# Patient Record
Sex: FEMALE | Race: WHITE | NOT HISPANIC OR LATINO | Employment: OTHER | URBAN - METROPOLITAN AREA
[De-identification: names, ages, dates, MRNs, and addresses within clinical notes are randomized per-mention and may not be internally consistent; named-entity substitution may affect disease eponyms.]

---

## 2019-07-05 ENCOUNTER — HOSPITAL ENCOUNTER (EMERGENCY)
Facility: HOSPITAL | Age: 59
Discharge: HOME | End: 2019-07-05
Attending: EMERGENCY MEDICINE
Payer: COMMERCIAL

## 2019-07-05 VITALS
OXYGEN SATURATION: 100 % | DIASTOLIC BLOOD PRESSURE: 61 MMHG | RESPIRATION RATE: 18 BRPM | HEIGHT: 64 IN | TEMPERATURE: 98 F | BODY MASS INDEX: 25.95 KG/M2 | WEIGHT: 152 LBS | SYSTOLIC BLOOD PRESSURE: 125 MMHG | HEART RATE: 57 BPM

## 2019-07-05 DIAGNOSIS — R07.81 RIB PAIN ON LEFT SIDE: ICD-10-CM

## 2019-07-05 DIAGNOSIS — S16.1XXA CERVICAL STRAIN, ACUTE, INITIAL ENCOUNTER: ICD-10-CM

## 2019-07-05 DIAGNOSIS — S22.42XA CLOSED FRACTURE OF MULTIPLE RIBS OF LEFT SIDE, INITIAL ENCOUNTER: Primary | ICD-10-CM
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
## 2019-07-05 PROCEDURE — 99284 EMERGENCY DEPT VISIT MOD MDM: CPT | Mod: 25,ER

## 2019-07-05 PROCEDURE — 94799 UNLISTED PULMONARY SVC/PX: CPT | Mod: ER

## 2019-07-05 PROCEDURE — 99900035 HC TECH TIME PER 15 MIN (STAT): Mod: ER

## 2019-07-05 PROCEDURE — 25000003 PHARM REV CODE 250: Mod: ER | Performed by: NURSE PRACTITIONER

## 2019-07-05 PROCEDURE — 96372 THER/PROPH/DIAG INJ SC/IM: CPT | Mod: ER

## 2019-07-05 PROCEDURE — 63600175 PHARM REV CODE 636 W HCPCS: Mod: ER | Performed by: NURSE PRACTITIONER

## 2019-07-05 RX ORDER — KETOROLAC TROMETHAMINE 30 MG/ML
30 INJECTION, SOLUTION INTRAMUSCULAR; INTRAVENOUS
Status: COMPLETED | OUTPATIENT
Start: 2019-07-05 | End: 2019-07-05

## 2019-07-05 RX ORDER — ESCITALOPRAM OXALATE 20 MG/1
40 TABLET ORAL DAILY
COMMUNITY
End: 2021-12-03

## 2019-07-05 RX ORDER — HYDROCODONE BITARTRATE AND ACETAMINOPHEN 7.5; 325 MG/1; MG/1
1 TABLET ORAL EVERY 6 HOURS PRN
Qty: 12 TABLET | Refills: 0 | Status: SHIPPED | OUTPATIENT
Start: 2019-07-05 | End: 2019-07-08

## 2019-07-05 RX ORDER — METHOCARBAMOL 500 MG/1
TABLET, FILM COATED ORAL
Status: DISCONTINUED
Start: 2019-07-05 | End: 2019-07-05 | Stop reason: HOSPADM

## 2019-07-05 RX ORDER — MELOXICAM 15 MG/1
15 TABLET ORAL DAILY
COMMUNITY
End: 2023-08-30

## 2019-07-05 RX ORDER — LEVOTHYROXINE SODIUM ANHYDROUS 100 UG/5ML
INJECTION, POWDER, LYOPHILIZED, FOR SOLUTION INTRAVENOUS
COMMUNITY
Start: 2008-02-19 | End: 2021-12-03

## 2019-07-05 RX ORDER — METHOCARBAMOL 750 MG/1
750 TABLET, FILM COATED ORAL
Status: COMPLETED | OUTPATIENT
Start: 2019-07-05 | End: 2019-07-05

## 2019-07-05 RX ORDER — METHOCARBAMOL 750 MG/1
750 TABLET, FILM COATED ORAL 3 TIMES DAILY
Qty: 15 TABLET | Refills: 0 | Status: SHIPPED | OUTPATIENT
Start: 2019-07-05 | End: 2019-07-10

## 2019-07-05 RX ADMIN — KETOROLAC TROMETHAMINE 30 MG: 30 INJECTION, SOLUTION INTRAMUSCULAR at 11:07

## 2019-07-05 RX ADMIN — METHOCARBAMOL TABLETS 750 MG: 500 TABLET, COATED ORAL at 11:07

## 2019-07-05 NOTE — ED PROVIDER NOTES
Encounter Date: 7/5/2019       History     Chief Complaint   Patient presents with    Back Pain     c/o L mid back pain x 2 days worsening with deep inspiration s/p slip and fall on wet floor; reports hitting head; denies LOC    Fall    Nausea     58-year-old female presents with left rib pain after a slip and fall onto a tile floor 2 days ago.  She denies LOC.  She denies any chest pain, shortness breath, weakness, headache.  She is ambulatory without assistance.  She reports the pain is worse with deep breathing.    The history is provided by the patient. No  was used.   Fall   The accident occurred two days ago. The fall occurred while walking. She fell from a height of 1 to 2 ft. She landed on a hard floor. There was no blood loss. The pain is present in the back. The pain is at a severity of 6/10. She was ambulatory at the scene. There was no entrapment after the fall. There was no drug use involved in the accident. There was no alcohol use involved in the accident. Associated symptoms include neck pain, back pain and nausea. Pertinent negatives include no paresthesias, no paralysis, no visual change, no fever, no numbness, no abdominal pain, no bowel incontinence, no vomiting, no hematuria, no headaches, no hearing loss, no loss of consciousness and no tingling. The symptoms are aggravated by pressure on the injury. She has tried nothing for the symptoms.     Review of patient's allergies indicates:   Allergen Reactions    Aspirin      Other reaction(s): whelps & swelling    Sulfa (sulfonamide antibiotics) Rash     Past Medical History:   Diagnosis Date    Depression     GERD (gastroesophageal reflux disease)     Thyroid disease     Vertigo      History reviewed. No pertinent surgical history.  History reviewed. No pertinent family history.  Social History     Tobacco Use    Smoking status: Never Smoker    Smokeless tobacco: Never Used   Substance Use Topics    Alcohol use: Yes      Frequency: Never     Comment: occasion    Drug use: Never     Review of Systems   Constitutional: Negative for fever.   HENT: Negative for sore throat.    Respiratory: Negative for cough, chest tightness, shortness of breath, wheezing and stridor.    Cardiovascular: Negative for chest pain.   Gastrointestinal: Positive for nausea. Negative for abdominal pain, bowel incontinence and vomiting.   Genitourinary: Negative for dysuria and hematuria.   Musculoskeletal: Positive for back pain, myalgias and neck pain. Negative for neck stiffness.   Skin: Negative for rash.   Neurological: Negative for tingling, loss of consciousness, weakness, numbness, headaches and paresthesias.   Hematological: Does not bruise/bleed easily.       Physical Exam     Initial Vitals [07/05/19 1045]   BP Pulse Resp Temp SpO2   (!) 126/58 (!) 58 18 98.4 °F (36.9 °C) 100 %      MAP       --         Physical Exam    Constitutional: Vital signs are normal. She appears well-developed and well-nourished. She is not diaphoretic. She is active.  Non-toxic appearance. She does not have a sickly appearance. She does not appear ill. No distress.   HENT:   Head: Normocephalic and atraumatic.   Neck: Muscular tenderness present. No spinous process tenderness present. No edema, no erythema and normal range of motion present. No neck rigidity.       Cardiovascular: Regular rhythm, S1 normal and S2 normal.   Pulmonary/Chest: Effort normal and breath sounds normal. No respiratory distress. She has no decreased breath sounds. She has no wheezes. She has no rhonchi. She has no rales.           Rib tenderness noted with palpation.   Abdominal: She exhibits no distension. There is no tenderness. There is no rigidity, no rebound, no guarding and no CVA tenderness.   Neurological: She is alert. GCS eye subscore is 4. GCS verbal subscore is 5. GCS motor subscore is 6.   Skin: Skin is warm and dry. No pallor.   Psychiatric: She has a normal mood and affect. Her  speech is normal and behavior is normal. Thought content normal.         ED Course   Procedures  Labs Reviewed - No data to display       Imaging Results          X-Ray Ribs 2 View Left (Final result)  Result time 07/05/19 11:40:01    Final result by ANGEL Baig Sr., MD (07/05/19 11:40:01)                 Impression:      There are poorly visualized fractures in the posterior aspect of the left 7th and 8th ribs.  If additional imaging evaluation is clinically indicated, I recommend consideration of a CT examination of the thorax with IV contrast..      Electronically signed by: Marco Baig MD  Date:    07/05/2019  Time:    11:40             Narrative:    EXAMINATION:  XR RIBS 2 VIEW LEFT    CLINICAL HISTORY:  Pleurodynia    COMPARISON:  None    FINDINGS:  There are poorly visualized fractures in the posterior aspect of the left 7th and 8th ribs.  The visualized portion of the lungs is clear.  There is no pneumothorax.  The left costophrenic angle is sharp..                                 Medical Decision Making:   Clinical Tests:   Radiological Study: Ordered and Reviewed  ED Management:  Lungs sounds are clear throughout.  Fractured rib noted to left 7 and left 8.  Will treat patient with incentive spirometry.  She will follow up with her PCP.  Patient is presently taking Mobic daily therefore will add a prescription for Norco and Robaxin.  Patient agrees with plan of care.                   ED Course as of Jul 05 1238   Fri Jul 05, 2019   1204 The patient agrees the plan of care.  She will follow up with her PCP in 2-3 days.  She is in no apparent distress.  She is ambulatory without assistance.  She will continue taking her medications at home as prescribed.  She will take her Norco and Robaxin as prescribed.    [TS]      ED Course User Index  [TS] Khadar Joiner NP     Clinical Impression:       ICD-10-CM ICD-9-CM   1. Closed fracture of multiple ribs of left side, initial encounter S22.42XA 807.09    2. Rib pain on left side R07.81 786.50   3. Fall from ground level W18.30XA E888.9   4. Fall from slip, trip, or stumble, initial encounter W01.0XXA E885.9   5. Cervical strain, acute, initial encounter S16.1XXA 847.0                                Khadar Joiner, ZANE  07/05/19 1204       Khadar Joiner NP  07/05/19 1238

## 2021-02-21 ENCOUNTER — HOSPITAL ENCOUNTER (EMERGENCY)
Facility: HOSPITAL | Age: 61
Discharge: HOME | End: 2021-02-21
Attending: EMERGENCY MEDICINE
Payer: COMMERCIAL

## 2021-02-21 VITALS
SYSTOLIC BLOOD PRESSURE: 140 MMHG | DIASTOLIC BLOOD PRESSURE: 73 MMHG | HEART RATE: 61 BPM | WEIGHT: 152 LBS | OXYGEN SATURATION: 97 % | TEMPERATURE: 98 F | RESPIRATION RATE: 16 BRPM | BODY MASS INDEX: 25.95 KG/M2 | HEIGHT: 64 IN

## 2021-02-21 DIAGNOSIS — S61.215A LACERATION OF LEFT RING FINGER W/O FOREIGN BODY W/O DAMAGE TO NAIL, INITIAL ENCOUNTER: ICD-10-CM
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
## 2021-02-21 DIAGNOSIS — S61.213A LACERATION OF LEFT MIDDLE FINGER W/O FOREIGN BODY W/O DAMAGE TO NAIL, INITIAL ENCOUNTER: Primary | ICD-10-CM

## 2021-02-21 PROCEDURE — 25000003 PHARM REV CODE 250: Mod: ER | Performed by: PHYSICIAN ASSISTANT

## 2021-02-21 PROCEDURE — 12004 RPR S/N/AX/GEN/TRK7.6-12.5CM: CPT | Mod: F2,ER

## 2021-02-21 PROCEDURE — 99284 EMERGENCY DEPT VISIT MOD MDM: CPT | Mod: 25,ER

## 2021-02-21 RX ORDER — CEPHALEXIN 500 MG/1
500 CAPSULE ORAL EVERY 8 HOURS
Qty: 9 CAPSULE | Refills: 0 | Status: SHIPPED | OUTPATIENT
Start: 2021-02-21 | End: 2021-02-24

## 2021-02-21 RX ORDER — LIDOCAINE HYDROCHLORIDE 10 MG/ML
5 INJECTION, SOLUTION EPIDURAL; INFILTRATION; INTRACAUDAL; PERINEURAL
Status: COMPLETED | OUTPATIENT
Start: 2021-02-21 | End: 2021-02-21

## 2021-02-21 RX ORDER — ACETAMINOPHEN AND CODEINE PHOSPHATE 300; 30 MG/1; MG/1
1 TABLET ORAL EVERY 6 HOURS PRN
Qty: 10 TABLET | Refills: 0 | Status: SHIPPED | OUTPATIENT
Start: 2021-02-21 | End: 2021-02-24

## 2021-02-21 RX ADMIN — Medication: at 01:02

## 2021-02-21 RX ADMIN — LIDOCAINE HYDROCHLORIDE 50 MG: 10 INJECTION, SOLUTION EPIDURAL; INFILTRATION; INTRACAUDAL at 03:02

## 2021-12-03 ENCOUNTER — OFFICE VISIT (OUTPATIENT)
Dept: OBSTETRICS AND GYNECOLOGY | Facility: CLINIC | Age: 61
End: 2021-12-03
Attending: OBSTETRICS & GYNECOLOGY
Payer: COMMERCIAL

## 2021-12-03 ENCOUNTER — LAB VISIT (OUTPATIENT)
Dept: LAB | Facility: HOSPITAL | Age: 61
End: 2021-12-03
Attending: OBSTETRICS & GYNECOLOGY
Payer: COMMERCIAL

## 2021-12-03 VITALS
DIASTOLIC BLOOD PRESSURE: 80 MMHG | SYSTOLIC BLOOD PRESSURE: 122 MMHG | HEIGHT: 64 IN | BODY MASS INDEX: 26 KG/M2 | WEIGHT: 152.31 LBS

## 2021-12-03 DIAGNOSIS — Z00.00 WELLNESS EXAMINATION: ICD-10-CM

## 2021-12-03 DIAGNOSIS — Z11.51 SCREENING FOR HUMAN PAPILLOMAVIRUS: ICD-10-CM

## 2021-12-03 DIAGNOSIS — Z01.419 WELL FEMALE EXAM WITH ROUTINE GYNECOLOGICAL EXAM: Primary | ICD-10-CM

## 2021-12-03 DIAGNOSIS — Z13.820 SCREENING FOR OSTEOPOROSIS: ICD-10-CM

## 2021-12-03 DIAGNOSIS — Z12.31 VISIT FOR SCREENING MAMMOGRAM: ICD-10-CM

## 2021-12-03 LAB — 25(OH)D3+25(OH)D2 SERPL-MCNC: 35 NG/ML (ref 30–96)

## 2021-12-03 PROCEDURE — 82306 VITAMIN D 25 HYDROXY: CPT | Performed by: OBSTETRICS & GYNECOLOGY

## 2021-12-03 PROCEDURE — 99999 PR PBB SHADOW E&M-EST. PATIENT-LVL III: ICD-10-PCS | Mod: PBBFAC,,, | Performed by: OBSTETRICS & GYNECOLOGY

## 2021-12-03 PROCEDURE — 88175 CYTOPATH C/V AUTO FLUID REDO: CPT | Performed by: OBSTETRICS & GYNECOLOGY

## 2021-12-03 PROCEDURE — 99386 PREV VISIT NEW AGE 40-64: CPT | Mod: S$GLB,,, | Performed by: OBSTETRICS & GYNECOLOGY

## 2021-12-03 PROCEDURE — 87624 HPV HI-RISK TYP POOLED RSLT: CPT | Performed by: OBSTETRICS & GYNECOLOGY

## 2021-12-03 PROCEDURE — 99386 PR PREVENTIVE VISIT,NEW,40-64: ICD-10-PCS | Mod: S$GLB,,, | Performed by: OBSTETRICS & GYNECOLOGY

## 2021-12-03 PROCEDURE — 99999 PR PBB SHADOW E&M-EST. PATIENT-LVL III: CPT | Mod: PBBFAC,,, | Performed by: OBSTETRICS & GYNECOLOGY

## 2021-12-03 RX ORDER — CLOBETASOL PROPIONATE 0.5 MG/G
OINTMENT TOPICAL 2 TIMES DAILY
Qty: 60 G | Refills: 0 | Status: SHIPPED | OUTPATIENT
Start: 2021-12-03 | End: 2022-10-10

## 2021-12-03 RX ORDER — PROGESTERONE 200 MG/1
200 CAPSULE ORAL NIGHTLY
Qty: 30 CAPSULE | Refills: 11 | Status: SHIPPED | OUTPATIENT
Start: 2021-12-03 | End: 2022-10-10 | Stop reason: SDUPTHER

## 2021-12-03 RX ORDER — CITALOPRAM 40 MG/1
40 TABLET, FILM COATED ORAL DAILY
COMMUNITY
Start: 2021-08-24 | End: 2022-10-10 | Stop reason: SDUPTHER

## 2021-12-03 RX ORDER — LEVOTHYROXINE SODIUM 100 UG/1
TABLET ORAL
COMMUNITY
Start: 2021-03-08 | End: 2022-09-30

## 2021-12-09 ENCOUNTER — HOSPITAL ENCOUNTER (OUTPATIENT)
Dept: RADIOLOGY | Facility: HOSPITAL | Age: 61
Discharge: HOME | End: 2021-12-09
Attending: OBSTETRICS & GYNECOLOGY
Payer: COMMERCIAL

## 2021-12-09 DIAGNOSIS — Z13.820 SCREENING FOR OSTEOPOROSIS: ICD-10-CM

## 2021-12-09 LAB
HPV HR 12 DNA SPEC QL NAA+PROBE: NEGATIVE
HPV16 AG SPEC QL: NEGATIVE
HPV18 DNA SPEC QL NAA+PROBE: NEGATIVE

## 2021-12-09 PROCEDURE — 77080 DXA BONE DENSITY AXIAL: CPT | Mod: TC,PO

## 2021-12-10 LAB
FINAL PATHOLOGIC DIAGNOSIS: NORMAL
Lab: NORMAL

## 2022-03-14 ENCOUNTER — HOSPITAL ENCOUNTER (EMERGENCY)
Facility: HOSPITAL | Age: 62
Discharge: HOME | End: 2022-03-14
Attending: EMERGENCY MEDICINE
Payer: COMMERCIAL

## 2022-03-14 VITALS
BODY MASS INDEX: 25.95 KG/M2 | TEMPERATURE: 98 F | WEIGHT: 152 LBS | HEIGHT: 64 IN | OXYGEN SATURATION: 100 % | DIASTOLIC BLOOD PRESSURE: 77 MMHG | RESPIRATION RATE: 18 BRPM | SYSTOLIC BLOOD PRESSURE: 169 MMHG | HEART RATE: 52 BPM

## 2022-03-14 DIAGNOSIS — M62.830 BACK MUSCLE SPASM: ICD-10-CM
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
## 2022-03-14 DIAGNOSIS — M79.18 MYALGIA OF MUSCLE OF NECK: Primary | ICD-10-CM

## 2022-03-14 PROCEDURE — 25000003 PHARM REV CODE 250: Mod: ER | Performed by: PHYSICIAN ASSISTANT

## 2022-03-14 PROCEDURE — 63600175 PHARM REV CODE 636 W HCPCS: Mod: ER | Performed by: PHYSICIAN ASSISTANT

## 2022-03-14 PROCEDURE — 99284 EMERGENCY DEPT VISIT MOD MDM: CPT | Mod: 25,ER

## 2022-03-14 PROCEDURE — 96372 THER/PROPH/DIAG INJ SC/IM: CPT | Performed by: PHYSICIAN ASSISTANT

## 2022-03-14 RX ORDER — LIDOCAINE 50 MG/G
1 PATCH TOPICAL DAILY
Qty: 15 PATCH | Refills: 0 | Status: SHIPPED | OUTPATIENT
Start: 2022-03-14 | End: 2022-09-30

## 2022-03-14 RX ORDER — LIDOCAINE 50 MG/G
2 PATCH TOPICAL
Status: DISCONTINUED | OUTPATIENT
Start: 2022-03-14 | End: 2022-03-14 | Stop reason: HOSPADM

## 2022-03-14 RX ORDER — METHOCARBAMOL 500 MG/1
500-1000 TABLET, FILM COATED ORAL 2 TIMES DAILY PRN
Qty: 20 TABLET | Refills: 0 | Status: SHIPPED | OUTPATIENT
Start: 2022-03-14 | End: 2022-03-19

## 2022-03-14 RX ORDER — NAPROXEN 500 MG/1
500 TABLET ORAL 2 TIMES DAILY WITH MEALS
Qty: 20 TABLET | Refills: 0 | Status: SHIPPED | OUTPATIENT
Start: 2022-03-14 | End: 2022-03-24

## 2022-03-14 RX ORDER — KETOROLAC TROMETHAMINE 30 MG/ML
15 INJECTION, SOLUTION INTRAMUSCULAR; INTRAVENOUS
Status: COMPLETED | OUTPATIENT
Start: 2022-03-14 | End: 2022-03-14

## 2022-03-14 RX ADMIN — KETOROLAC TROMETHAMINE 15 MG: 30 INJECTION, SOLUTION INTRAMUSCULAR; INTRAVENOUS at 08:03

## 2022-03-14 RX ADMIN — LIDOCAINE 2 PATCH: 50 PATCH CUTANEOUS at 08:03
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
## 2022-03-15 NOTE — DISCHARGE INSTRUCTIONS
Please follow-up with primary care physician in the next 2-3 days.  Use heat pack to the affected region.  Take medication as prescribed.  Return to the ER for symptoms worsen.

## 2022-06-22 ENCOUNTER — HOSPITAL ENCOUNTER (EMERGENCY)
Facility: HOSPITAL | Age: 62
Discharge: HOME | End: 2022-06-22
Attending: EMERGENCY MEDICINE

## 2022-06-22 VITALS
WEIGHT: 148 LBS | HEIGHT: 64 IN | DIASTOLIC BLOOD PRESSURE: 70 MMHG | TEMPERATURE: 99 F | SYSTOLIC BLOOD PRESSURE: 137 MMHG | RESPIRATION RATE: 24 BRPM | BODY MASS INDEX: 25.27 KG/M2 | OXYGEN SATURATION: 96 % | HEART RATE: 62 BPM

## 2022-06-22 DIAGNOSIS — J06.9 VIRAL URI WITH COUGH: Primary | ICD-10-CM

## 2022-06-22 DIAGNOSIS — R06.02 SOB (SHORTNESS OF BREATH): ICD-10-CM

## 2022-06-22 LAB
GROUP A STREP, MOLECULAR: NEGATIVE
INFLUENZA A, MOLECULAR: NEGATIVE
INFLUENZA B, MOLECULAR: NEGATIVE
SARS-COV-2 RDRP RESP QL NAA+PROBE: NEGATIVE
SPECIMEN SOURCE: NORMAL

## 2022-06-22 PROCEDURE — 93010 EKG 12-LEAD: ICD-10-PCS | Mod: ,,, | Performed by: INTERNAL MEDICINE

## 2022-06-22 PROCEDURE — 99285 EMERGENCY DEPT VISIT HI MDM: CPT | Mod: 25,ER

## 2022-06-22 PROCEDURE — 87502 INFLUENZA DNA AMP PROBE: CPT | Mod: ER | Performed by: EMERGENCY MEDICINE

## 2022-06-22 PROCEDURE — U0002 COVID-19 LAB TEST NON-CDC: HCPCS | Mod: ER | Performed by: EMERGENCY MEDICINE

## 2022-06-22 PROCEDURE — 87502 INFLUENZA DNA AMP PROBE: CPT | Mod: ER

## 2022-06-22 PROCEDURE — 93010 ELECTROCARDIOGRAM REPORT: CPT | Mod: ,,, | Performed by: INTERNAL MEDICINE

## 2022-06-22 PROCEDURE — 93005 ELECTROCARDIOGRAM TRACING: CPT | Mod: ER

## 2022-06-22 PROCEDURE — 99900035 HC TECH TIME PER 15 MIN (STAT): Mod: ER

## 2022-06-22 PROCEDURE — 87651 STREP A DNA AMP PROBE: CPT | Mod: ER | Performed by: EMERGENCY MEDICINE

## 2022-06-22 RX ORDER — ALBUTEROL SULFATE 90 UG/1
1-2 AEROSOL, METERED RESPIRATORY (INHALATION) EVERY 6 HOURS PRN
Qty: 18 G | Refills: 0 | Status: SHIPPED | OUTPATIENT
Start: 2022-06-22 | End: 2023-06-22

## 2022-06-22 RX ORDER — BENZONATATE 100 MG/1
100 CAPSULE ORAL 3 TIMES DAILY PRN
Qty: 20 CAPSULE | Refills: 0 | Status: SHIPPED | OUTPATIENT
Start: 2022-06-22 | End: 2022-07-22

## 2022-06-22 NOTE — ED PROVIDER NOTES
"Encounter Date: 6/22/2022       History     Chief Complaint   Patient presents with    Cough    Shortness of Breath     Sore throat that Monday. Cough that started yesterday. SOB that started today. PT reports productive cough and sputum looks like "oysters"     60-year-old female with history GERD presents with sore throat and hoarseness since Monday and cough that started yesterday.  Patient is also reporting some post-tussive chest discomfort.  This morning she says she felt terrible.  She denies vomiting, abdominal pain, diarrhea, body aches, dysuria.  She says her sputum looks like color of an oyster.        Review of patient's allergies indicates:   Allergen Reactions    Aspirin      Other reaction(s): whelps & swelling    Sulfa (sulfonamide antibiotics) Rash     Past Medical History:   Diagnosis Date    Abnormal Pap smear of cervix     Depression     GERD (gastroesophageal reflux disease)     Melanoma     Thyroid disease     Vertigo      Past Surgical History:   Procedure Laterality Date    CHOLECYSTECTOMY      EXCISION OF MELANOMA Left     ankle with removal of 2 lymph nodes    GYNECOLOGIC CRYOSURGERY  1980     Family History   Problem Relation Age of Onset    Lung cancer Father     Stomach cancer Mother     Brain cancer Mother     Ovarian cancer Sister      Social History     Tobacco Use    Smoking status: Never Smoker    Smokeless tobacco: Never Used   Substance Use Topics    Alcohol use: Yes     Comment: occasion    Drug use: Never     Review of Systems   Constitutional: Negative for fever.   HENT: Positive for sore throat and voice change.    Eyes: Negative for pain.   Respiratory: Positive for cough. Negative for shortness of breath.    Cardiovascular: Positive for chest pain.        Chest pain after cough   Gastrointestinal: Negative for abdominal pain, nausea and vomiting.   Genitourinary: Negative for difficulty urinating.   Musculoskeletal: Negative for back pain and neck pain. "   Neurological: Negative for headaches.   Psychiatric/Behavioral: Negative for confusion.       Physical Exam     Initial Vitals [06/22/22 0949]   BP Pulse Resp Temp SpO2   128/62 68 18 98.9 °F (37.2 °C) 97 %      MAP       --         Physical Exam    Nursing note and vitals reviewed.  HENT:   Head: Atraumatic.   Right Ear: External ear normal.   Left Ear: External ear normal.   Mouth/Throat: Oropharynx is clear and moist.   Eyes: Conjunctivae and EOM are normal.   Neck:   Normal range of motion.  Cardiovascular: Exam reveals no gallop and no friction rub.    No murmur heard.  Pulmonary/Chest: Breath sounds normal. No respiratory distress. She has no wheezes.   Abdominal: Abdomen is soft. There is no abdominal tenderness.   Musculoskeletal:         General: No edema. Normal range of motion.      Cervical back: Normal range of motion.     Neurological: She is alert and oriented to person, place, and time.   Psychiatric: She has a normal mood and affect.         ED Course   Procedures  Labs Reviewed   INFLUENZA A & B BY MOLECULAR   GROUP A STREP, MOLECULAR   SARS-COV-2 RNA AMPLIFICATION, QUAL    Narrative:     Is the patient symptomatic?->Yes        ECG Results          EKG 12-lead (Final result)  Result time 06/22/22 11:15:21    Final result by Interface, Lab In [facility] (06/22/22 11:15:21)                 Narrative:    Test Reason : R06.02,    Vent. Rate : 063 BPM     Atrial Rate : 063 BPM     P-R Int : 184 ms          QRS Dur : 104 ms      QT Int : 412 ms       P-R-T Axes : 064 062 047 degrees     QTc Int : 421 ms    Normal sinus rhythm  Incomplete right bundle branch block  Borderline Abnormal ECG  No previous ECGs available  Confirmed by Taiwo KAM, Matty GAVIRIA ([id number]) on 6/22/2022 11:15:17 AM    Referred By: [email or web address]   SELF           Confirmed By:Matty Maravilla MD                            Imaging Results          X-Ray Chest AP Portable (Final result)  Result time 06/22/22 10:23:10    Final result by Kareen PAULINO  MD José Miguel (06/22/22 10:23:10)                 Impression:      No acute cardiopulmonary abnormality.      Electronically signed by: Kareen José Miguel  Date:    06/22/2022  Time:    10:23             Narrative:    EXAMINATION:  XR CHEST AP PORTABLE    CLINICAL HISTORY:  cough;    TECHNIQUE:  Single frontal view of the chest was performed.    COMPARISON:  Left rib radiograph 07/05/2019    FINDINGS:  ECG monitoring leads overlie the chest.The lungs are clear, with normal appearance of pulmonary vasculature and no pleural effusion or pneumothorax.    The cardiac silhouette is enlarged.  The hilar and mediastinal contours are unremarkable.    Multiple prior left rib fractures noted.  There is degenerative change of the spine.  No definite acute osseous abnormality.  Cholecystectomy clips are noted.                                 Medications - No data to display  Medical Decision Making:   Initial Assessment:   61-year-old female presenting with sore throat/hoarseness since Monday now with cough and feeling well.  Vital signs unremarkable.  Well-appearing exam.  Lungs are clear.  Oropharynx is clear.  EKG reviewed interpreted myself shows no evidence of acute ischemia.  Suspect that patient's chest pain is secondary chest wall pain excessive coughing and cardiac or other life-threatening etiologies.  Chest x-ray shows no infiltrates pneumothorax.  Strep and flu swabs negative.  Suspect viral illness. Plan for symptomatic treatment and pcp follow up                      Clinical Impression:   Final diagnoses:  [R06.02] SOB (shortness of breath)  [J06.9] Viral URI with cough (Primary)          ED Disposition Condition    Discharge Stable        ED Prescriptions     Medication Sig Dispense Start Date End Date Auth. Provider    benzonatate (TESSALON) 100 MG capsule Take 1 capsule (100 mg total) by mouth 3 (three) times daily as needed for Cough. 20 capsule 6/22/2022 7/22/2022 Mina Moon MD    albuterol  (PROVENTIL/VENTOLIN HFA) 90 mcg/actuation inhaler Inhale 1-2 puffs into the lungs every 6 (six) hours as needed for Wheezing. 18 g 6/22/2022 6/22/2023 Mina Moon MD        Follow-up Information     Follow up With Specialties Details Why Contact Info    Primary Care  Schedule an appointment as soon as possible for a visit in 3 days             Mina Moon MD  06/22/22 0766

## 2022-09-20 ENCOUNTER — TELEPHONE (OUTPATIENT)
Dept: OBSTETRICS AND GYNECOLOGY | Facility: CLINIC | Age: 62
End: 2022-09-20
Payer: COMMERCIAL

## 2022-09-20 NOTE — TELEPHONE ENCOUNTER
Pt feels like she has a laceration in perineum that is painful and bleeding lightly.  Has not had rough intercourse.  Has suffered with vaginal dryness for years.  Has a cream for that.  Cannot take hormones due to h/o of CA.  Requesting an appt.  Scheduled with Dr. Hill 9/30.  Did not want to see NPFlorentin Lees, do you have any appts with Dr. Hill in [place] in the AM?

## 2022-09-30 ENCOUNTER — OFFICE VISIT (OUTPATIENT)
Dept: OBSTETRICS AND GYNECOLOGY | Facility: CLINIC | Age: 62
End: 2022-09-30
Attending: OBSTETRICS & GYNECOLOGY
Payer: COMMERCIAL

## 2022-09-30 VITALS
WEIGHT: 144.81 LBS | HEIGHT: 64 IN | SYSTOLIC BLOOD PRESSURE: 120 MMHG | BODY MASS INDEX: 24.72 KG/M2 | DIASTOLIC BLOOD PRESSURE: 72 MMHG

## 2022-09-30 DIAGNOSIS — Z12.31 VISIT FOR SCREENING MAMMOGRAM: ICD-10-CM

## 2022-09-30 DIAGNOSIS — N90.89 VULVAL LESION: Primary | ICD-10-CM

## 2022-09-30 PROCEDURE — 3074F PR MOST RECENT SYSTOLIC BLOOD PRESSURE < 130 MM HG: ICD-10-PCS | Mod: CPTII,S$GLB,, | Performed by: OBSTETRICS & GYNECOLOGY

## 2022-09-30 PROCEDURE — 3008F BODY MASS INDEX DOCD: CPT | Mod: CPTII,S$GLB,, | Performed by: OBSTETRICS & GYNECOLOGY

## 2022-09-30 PROCEDURE — 3008F PR BODY MASS INDEX (BMI) DOCUMENTED: ICD-10-PCS | Mod: CPTII,S$GLB,, | Performed by: OBSTETRICS & GYNECOLOGY

## 2022-09-30 PROCEDURE — 99999 PR PBB SHADOW E&M-EST. PATIENT-LVL III: ICD-10-PCS | Mod: PBBFAC,,, | Performed by: OBSTETRICS & GYNECOLOGY

## 2022-09-30 PROCEDURE — 88312 SPECIAL STAINS GROUP 1: CPT | Mod: 26,,, | Performed by: PATHOLOGY

## 2022-09-30 PROCEDURE — 88341 IMHCHEM/IMCYTCHM EA ADD ANTB: CPT | Mod: 26,,, | Performed by: PATHOLOGY

## 2022-09-30 PROCEDURE — 3078F DIAST BP <80 MM HG: CPT | Mod: CPTII,S$GLB,, | Performed by: OBSTETRICS & GYNECOLOGY

## 2022-09-30 PROCEDURE — 88305 TISSUE EXAM BY PATHOLOGIST: CPT | Performed by: PATHOLOGY

## 2022-09-30 PROCEDURE — 11104 PR PUNCH BIOPSY, SKIN, SINGLE LESION: ICD-10-PCS | Mod: S$GLB,,, | Performed by: OBSTETRICS & GYNECOLOGY

## 2022-09-30 PROCEDURE — 88341 PR IHC OR ICC EACH ADD'L SINGLE ANTIBODY  STAINPR: ICD-10-PCS | Mod: 26,,, | Performed by: PATHOLOGY

## 2022-09-30 PROCEDURE — 88305 TISSUE EXAM BY PATHOLOGIST: ICD-10-PCS | Mod: 26,,, | Performed by: PATHOLOGY

## 2022-09-30 PROCEDURE — 88312 PR  SPECIAL STAINS,GROUP I: ICD-10-PCS | Mod: 26,,, | Performed by: PATHOLOGY

## 2022-09-30 PROCEDURE — 88341 IMHCHEM/IMCYTCHM EA ADD ANTB: CPT | Performed by: PATHOLOGY

## 2022-09-30 PROCEDURE — 99212 PR OFFICE/OUTPT VISIT, EST, LEVL II, 10-19 MIN: ICD-10-PCS | Mod: 25,S$GLB,, | Performed by: OBSTETRICS & GYNECOLOGY

## 2022-09-30 PROCEDURE — 3078F PR MOST RECENT DIASTOLIC BLOOD PRESSURE < 80 MM HG: ICD-10-PCS | Mod: CPTII,S$GLB,, | Performed by: OBSTETRICS & GYNECOLOGY

## 2022-09-30 PROCEDURE — 88342 IMHCHEM/IMCYTCHM 1ST ANTB: CPT | Mod: 26,,, | Performed by: PATHOLOGY

## 2022-09-30 PROCEDURE — 99212 OFFICE O/P EST SF 10 MIN: CPT | Mod: 25,S$GLB,, | Performed by: OBSTETRICS & GYNECOLOGY

## 2022-09-30 PROCEDURE — 88342 IMHCHEM/IMCYTCHM 1ST ANTB: CPT | Mod: 91 | Performed by: PATHOLOGY

## 2022-09-30 PROCEDURE — 88342 CHG IMMUNOCYTOCHEMISTRY: ICD-10-PCS | Mod: 26,,, | Performed by: PATHOLOGY

## 2022-09-30 PROCEDURE — 88342 IMHCHEM/IMCYTCHM 1ST ANTB: CPT | Performed by: PATHOLOGY

## 2022-09-30 PROCEDURE — 88305 TISSUE EXAM BY PATHOLOGIST: CPT | Mod: 26,,, | Performed by: PATHOLOGY

## 2022-09-30 PROCEDURE — 3074F SYST BP LT 130 MM HG: CPT | Mod: CPTII,S$GLB,, | Performed by: OBSTETRICS & GYNECOLOGY

## 2022-09-30 PROCEDURE — 11104 PUNCH BX SKIN SINGLE LESION: CPT | Mod: S$GLB,,, | Performed by: OBSTETRICS & GYNECOLOGY

## 2022-09-30 PROCEDURE — 99999 PR PBB SHADOW E&M-EST. PATIENT-LVL III: CPT | Mod: PBBFAC,,, | Performed by: OBSTETRICS & GYNECOLOGY

## 2022-09-30 PROCEDURE — 88312 SPECIAL STAINS GROUP 1: CPT | Mod: 59 | Performed by: PATHOLOGY

## 2022-09-30 RX ORDER — PRASTERONE 6.5 MG/1
6.5 INSERT VAGINAL NIGHTLY
Qty: 30 EACH | Refills: 6 | Status: SHIPPED | OUTPATIENT
Start: 2022-09-30

## 2022-09-30 RX ORDER — LEVOTHYROXINE SODIUM 100 UG/1
100 TABLET ORAL
COMMUNITY
Start: 2022-09-01 | End: 2022-10-10 | Stop reason: SDUPTHER

## 2022-09-30 RX ORDER — CLOBETASOL PROPIONATE 0.5 MG/G
OINTMENT TOPICAL 2 TIMES DAILY
Qty: 45 G | Refills: 1 | Status: SHIPPED | OUTPATIENT
Start: 2022-09-30

## 2022-09-30 RX ORDER — PROGESTERONE 200 MG/1
200 CAPSULE ORAL NIGHTLY
Qty: 30 CAPSULE | Refills: 11 | Status: SHIPPED | OUTPATIENT
Start: 2022-09-30 | End: 2023-08-30

## 2022-10-02 NOTE — PROGRESS NOTES
Subjective:       Patient ID: Veronica Ash is a 62 y.o. female.    Chief Complaint:  Vaginitis      History of Present Illness  62 year old with long h/o vulvar irritation and perianal irritation.  Reports spotting with wiping but no vaginal bleeding.  Reports bowel issues with episodes of discomfort and diarrhea.  No bladder discomfort  Ran of of steroid and intrarosa    GYN & OB History  No LMP recorded. Patient is postmenopausal.   Date of Last Pap: 12/10/2021    OB History    Para Term  AB Living   3 1 1   2 1   SAB IAB Ectopic Multiple Live Births   2       1      # Outcome Date GA Lbr Shadi/2nd Weight Sex Delivery Anes PTL Lv   3 Term 85 40w0d  3.118 kg (6 lb 14 oz) M Vag-Spont   MONTSE   2 SAB            1 SAB                Past Medical History:   Diagnosis Date    Abnormal Pap smear of cervix     Depression     GERD (gastroesophageal reflux disease)     Melanoma     Thyroid disease     Vertigo        Past Surgical History:   Procedure Laterality Date    CHOLECYSTECTOMY      EXCISION OF MELANOMA Left     ankle with removal of 2 lymph nodes    GYNECOLOGIC CRYOSURGERY         Review of Systems  Review of Systems   Constitutional:  Negative for chills and fever.   Gastrointestinal:  Negative for abdominal pain, constipation, diarrhea, nausea and vomiting.   Genitourinary:  Negative for dysuria, frequency, hematuria, pelvic pain, urgency and vaginal discharge.   Musculoskeletal:  Positive for back pain.   Skin:  Negative for rash.   Neurological:  Positive for headaches.      Objective:   Physical Exam:        Pulmonary/Chest: Effort normal.        Abdominal: There is no abdominal tenderness.     Genitourinary:    Uterus normal.            Genitourinary Comments: Thin white atrophy vulva with labeled area with small ulcer   Perianal irritation                  Skin: Skin is warm.       Assessment/ Plan:     Vulval lesion  -     Specimen to Pathology, Ob/Gyn    Visit for screening  mammogram  -     Mammo Digital Screening Bilat w/ [facility]; Future; Expected date: 09/30/2022    Other orders  -     progesterone (PROMETRIUM) 200 MG capsule; Take 1 capsule (200 mg total) by mouth nightly.  Dispense: 30 capsule; Refill: 11  -     prasterone, dhea, (INTRAROSA) 6.5 mg Inst; Place 6.5 mg vaginally every evening.  Dispense: 30 each; Refill: 6  -     clobetasol 0.05% (TEMOVATE) 0.05 % Oint; Apply topically 2 (two) times daily.  Dispense: 45 g; Refill: 1    Lichen sclerosis reviewed biopsy collected with lidocaine   Discussed need for follow up      No follow-ups on file.    Patient was counseled today on A.C.S. Pap guidelines and recommendations for yearly pelvic exams, mammograms and monthly self breast exams; to see her PCP for other health maintenance.

## 2022-10-10 ENCOUNTER — OFFICE VISIT (OUTPATIENT)
Dept: FAMILY MEDICINE | Facility: CLINIC | Age: 62
End: 2022-10-10
Payer: COMMERCIAL

## 2022-10-10 VITALS
OXYGEN SATURATION: 98 % | SYSTOLIC BLOOD PRESSURE: 110 MMHG | BODY MASS INDEX: 25.56 KG/M2 | WEIGHT: 149.69 LBS | HEART RATE: 58 BPM | HEIGHT: 64 IN | DIASTOLIC BLOOD PRESSURE: 60 MMHG

## 2022-10-10 DIAGNOSIS — Z11.4 SCREENING FOR HIV (HUMAN IMMUNODEFICIENCY VIRUS): ICD-10-CM

## 2022-10-10 DIAGNOSIS — Z76.89 ENCOUNTER TO ESTABLISH CARE WITH NEW DOCTOR: Primary | ICD-10-CM

## 2022-10-10 DIAGNOSIS — R19.7 DIARRHEA, UNSPECIFIED TYPE: ICD-10-CM

## 2022-10-10 DIAGNOSIS — Z11.59 ENCOUNTER FOR HEPATITIS C SCREENING TEST FOR LOW RISK PATIENT: ICD-10-CM

## 2022-10-10 DIAGNOSIS — E03.9 HYPOTHYROIDISM, UNSPECIFIED TYPE: ICD-10-CM

## 2022-10-10 DIAGNOSIS — F32.A ANXIETY AND DEPRESSION: ICD-10-CM

## 2022-10-10 DIAGNOSIS — F41.9 ANXIETY AND DEPRESSION: ICD-10-CM

## 2022-10-10 DIAGNOSIS — R42 VERTIGO: ICD-10-CM

## 2022-10-10 DIAGNOSIS — Z23 ENCOUNTER FOR IMMUNIZATION: ICD-10-CM

## 2022-10-10 PROCEDURE — 99999 PR PBB SHADOW E&M-EST. PATIENT-LVL IV: CPT | Mod: PBBFAC,,, | Performed by: FAMILY MEDICINE

## 2022-10-10 PROCEDURE — 3074F PR MOST RECENT SYSTOLIC BLOOD PRESSURE < 130 MM HG: ICD-10-PCS | Mod: CPTII,S$GLB,, | Performed by: FAMILY MEDICINE

## 2022-10-10 PROCEDURE — 3078F PR MOST RECENT DIASTOLIC BLOOD PRESSURE < 80 MM HG: ICD-10-PCS | Mod: CPTII,S$GLB,, | Performed by: FAMILY MEDICINE

## 2022-10-10 PROCEDURE — 99999 PR PBB SHADOW E&M-EST. PATIENT-LVL IV: ICD-10-PCS | Mod: PBBFAC,,, | Performed by: FAMILY MEDICINE

## 2022-10-10 PROCEDURE — 90686 FLU VACCINE (QUAD) GREATER THAN OR EQUAL TO 3YO PRESERVATIVE FREE IM: ICD-10-PCS | Mod: S$GLB,,, | Performed by: FAMILY MEDICINE

## 2022-10-10 PROCEDURE — 99204 OFFICE O/P NEW MOD 45 MIN: CPT | Mod: 25,S$GLB,, | Performed by: FAMILY MEDICINE

## 2022-10-10 PROCEDURE — 90686 IIV4 VACC NO PRSV 0.5 ML IM: CPT | Mod: S$GLB,,, | Performed by: FAMILY MEDICINE

## 2022-10-10 PROCEDURE — 99204 PR OFFICE/OUTPT VISIT, NEW, LEVL IV, 45-59 MIN: ICD-10-PCS | Mod: 25,S$GLB,, | Performed by: FAMILY MEDICINE

## 2022-10-10 PROCEDURE — 3078F DIAST BP <80 MM HG: CPT | Mod: CPTII,S$GLB,, | Performed by: FAMILY MEDICINE

## 2022-10-10 PROCEDURE — 3008F BODY MASS INDEX DOCD: CPT | Mod: CPTII,S$GLB,, | Performed by: FAMILY MEDICINE

## 2022-10-10 PROCEDURE — 1159F MED LIST DOCD IN RCRD: CPT | Mod: CPTII,S$GLB,, | Performed by: FAMILY MEDICINE

## 2022-10-10 PROCEDURE — 1159F PR MEDICATION LIST DOCUMENTED IN MEDICAL RECORD: ICD-10-PCS | Mod: CPTII,S$GLB,, | Performed by: FAMILY MEDICINE

## 2022-10-10 PROCEDURE — 3008F PR BODY MASS INDEX (BMI) DOCUMENTED: ICD-10-PCS | Mod: CPTII,S$GLB,, | Performed by: FAMILY MEDICINE

## 2022-10-10 PROCEDURE — 3074F SYST BP LT 130 MM HG: CPT | Mod: CPTII,S$GLB,, | Performed by: FAMILY MEDICINE

## 2022-10-10 PROCEDURE — 90471 IMMUNIZATION ADMIN: CPT | Mod: S$GLB,,, | Performed by: FAMILY MEDICINE

## 2022-10-10 PROCEDURE — 90471 FLU VACCINE (QUAD) GREATER THAN OR EQUAL TO 3YO PRESERVATIVE FREE IM: ICD-10-PCS | Mod: S$GLB,,, | Performed by: FAMILY MEDICINE

## 2022-10-10 RX ORDER — LEVOTHYROXINE SODIUM 100 UG/1
100 TABLET ORAL
Qty: 90 TABLET | Refills: 1 | Status: SHIPPED | OUTPATIENT
Start: 2022-10-10 | End: 2022-10-21

## 2022-10-10 RX ORDER — MECLIZINE HYDROCHLORIDE 25 MG/1
25 TABLET ORAL 3 TIMES DAILY PRN
Qty: 60 TABLET | Refills: 1 | Status: SHIPPED | OUTPATIENT
Start: 2022-10-10 | End: 2023-07-11 | Stop reason: SDUPTHER

## 2022-10-10 RX ORDER — CITALOPRAM 40 MG/1
40 TABLET, FILM COATED ORAL DAILY
Qty: 90 TABLET | Refills: 1 | Status: SHIPPED | OUTPATIENT
Start: 2022-10-10 | End: 2023-06-25 | Stop reason: SDUPTHER

## 2022-10-10 NOTE — PROGRESS NOTES
(Portions of this note were dictated using voice recognition software and may contain dictation related errors in spelling/grammar/syntax not found on text review)    CC:   Chief Complaint   Patient presents with    [facility]       HPI: 62 y.o. female presented to [facility] as new patient. She has medical history significant for depression, GERD, hypothyroidism, vertigo. She is transitioning care from [facility]. She takes synthyroid 100 mcg, Celexa 40 mg, needs medication refills. She is having work up with gynecologist for lichen sclerosus, taking Prometrium 200 mg. Pt has chronic diarrhea with almost every food she eats, has family history of colon cancer, last colonoscopy was 5 to 7 years ago, pt does not remember accurately but needed to have repeat colosncopy early, requesting GI referral. She is due for labs and flu vaccine. She does not smoke, has no toxic habits. She denies having cough, SOB, chest pain, N/V, abdominal pain, urine problems including burning and dysurea.     Past Medical History:   Diagnosis Date    Abnormal Pap smear of cervix     Depression     GERD (gastroesophageal reflux disease)     Melanoma     Thyroid disease     Vertigo        Past Surgical History:   Procedure Laterality Date    CHOLECYSTECTOMY      EXCISION OF MELANOMA Left     ankle with removal of 2 lymph nodes    GYNECOLOGIC CRYOSURGERY  1980       Family History   Problem Relation Age of Onset    Lung cancer Father     Stomach cancer Mother     Brain cancer Mother     Ovarian cancer Sister        Social History     Tobacco Use    Smoking status: Never    Smokeless tobacco: Never   Substance Use Topics    Alcohol use: Yes     Comment: occasion    Drug use: Never       Lab Results   Component Value Date    [id number] 35 12/03/2021             Vital signs reviewed  PE:   APPEARANCE: Well nourished, well developed, in no acute distress.    HEAD: Normocephalic, atraumatic.  EYES: EOMI.  Conjunctivae noninjected.  NECK: Supple  with no cervical lymphadenopathy.    CHEST: Good inspiratory effort. Lungs clear to auscultation with no wheezes or crackles.  CARDIOVASCULAR: Normal S1, S2. No rubs, murmurs, or gallops.  ABDOMEN: Bowel sounds normal. Not distended. Soft. No tenderness or masses. No organomegaly.  EXTREMITIES: No edema, cyanosis, or clubbing.    Review of Systems   Constitutional:  Negative for chills, fatigue and fever.   HENT: Negative.     Respiratory:  Negative for cough, shortness of breath and wheezing.    Cardiovascular:  Negative for chest pain, palpitations and leg swelling.   Gastrointestinal:  Positive for diarrhea. Negative for abdominal pain, change in bowel habit, constipation, nausea, rectal pain, vomiting, reflux and change in bowel habit.   Genitourinary: Negative.    Musculoskeletal: Negative.    Neurological: Negative.    Psychiatric/Behavioral: Negative.     All other systems reviewed and are negative.    IMPRESSION  1. Encounter to establish care with new doctor    2. Encounter for immunization    3. Encounter for hepatitis C screening test for low risk patient    4. Screening for HIV (human immunodeficiency virus)    5. Diarrhea, unspecified type    6. BMI 25.0-25.9,adult    7. Hypothyroidism, unspecified type    8. Anxiety and depression    9. Vertigo          PLAN      1. Encounter for immunization  - Influenza (FLUAD) - Quadrivalent (Adjuvanted) *Preferred* (65+) (PF)      2. Encounter to establish care with new doctor  - CBC Auto Differential; Future  - Comprehensive Metabolic Panel; Future  - TSH; Future  - Lipid Panel; Future       3. Encounter for hepatitis C screening test for low risk patient  - Hepatitis C Antibody; Future      4. Screening for HIV (human immunodeficiency virus)  - HIV 1/2 Ag/Ab (4th Gen); Future      5. Diarrhea, unspecified type    - Ambulatory referral/consult to Gastroenterology; Future      6. BMI 25.0-25.9,adult  Counseling provided on healthy lifestyle, encouraged to do  moderate intensity regular exercise 30 minutes every day 5 days a week, to include vegetables and fruits and cut back on saturated fats and carbohydrates.       7. Hypothyroidism, unspecified type  - levothyroxine (SYNTHROID) 100 MCG tablet; Take 1 tablet (100 mcg total) by mouth before breakfast.  Dispense: 90 tablet; Refill: 1      8. Anxiety and depression  - citalopram (CELEXA) 40 MG tablet; Take 1 tablet (40 mg total) by mouth once daily.  Dispense: 90 tablet; Refill: 1      9. Vertigo  - meclizine (ANTIVERT) 25 mg tablet; Take 1 tablet (25 mg total) by mouth 3 (three) times daily as needed for Dizziness or Nausea.  Dispense: 60 tablet; Refill: 1         SCREENINGS      Immunizations:   UTD with Covid, tetanus and zoster vaccines      Age/demographic appropriate health maintenance:    Health Maintenance Due   Topic Date Due    Hepatitis C Screening  Never done    COVID-19 Vaccine (1) 03/12/1961    HIV Screening  Never done    Mammogram  04/23/2022    Influenza Vaccine (1) 09/01/2022           Yohannes Arevalo   10/10/2022

## 2022-10-14 ENCOUNTER — TELEPHONE (OUTPATIENT)
Dept: OBSTETRICS AND GYNECOLOGY | Facility: CLINIC | Age: 62
End: 2022-10-14
Payer: COMMERCIAL

## 2022-10-17 ENCOUNTER — HOSPITAL ENCOUNTER (OUTPATIENT)
Dept: RADIOLOGY | Facility: HOSPITAL | Age: 62
Discharge: HOME | End: 2022-10-17
Attending: NURSE PRACTITIONER
Payer: COMMERCIAL

## 2022-10-17 ENCOUNTER — OFFICE VISIT (OUTPATIENT)
Dept: GASTROENTEROLOGY | Facility: CLINIC | Age: 62
End: 2022-10-17
Payer: COMMERCIAL

## 2022-10-17 VITALS — BODY MASS INDEX: 24.35 KG/M2 | HEIGHT: 64 IN | WEIGHT: 142.63 LBS

## 2022-10-17 DIAGNOSIS — Z12.11 SCREENING FOR COLON CANCER: Primary | ICD-10-CM

## 2022-10-17 DIAGNOSIS — R19.7 DIARRHEA, UNSPECIFIED TYPE: ICD-10-CM

## 2022-10-17 LAB
FINAL PATHOLOGIC DIAGNOSIS: NORMAL
GROSS: NORMAL
Lab: NORMAL
MICROSCOPIC EXAM: NORMAL

## 2022-10-17 PROCEDURE — 1159F PR MEDICATION LIST DOCUMENTED IN MEDICAL RECORD: ICD-10-PCS | Mod: CPTII,S$GLB,, | Performed by: NURSE PRACTITIONER

## 2022-10-17 PROCEDURE — 99999 PR PBB SHADOW E&M-EST. PATIENT-LVL IV: ICD-10-PCS | Mod: PBBFAC,,, | Performed by: NURSE PRACTITIONER

## 2022-10-17 PROCEDURE — 74018 XR ABDOMEN AP 1 VIEW: ICD-10-PCS | Mod: 26,,, | Performed by: RADIOLOGY

## 2022-10-17 PROCEDURE — 99204 PR OFFICE/OUTPT VISIT, NEW, LEVL IV, 45-59 MIN: ICD-10-PCS | Mod: S$GLB,,, | Performed by: NURSE PRACTITIONER

## 2022-10-17 PROCEDURE — 74018 RADEX ABDOMEN 1 VIEW: CPT | Mod: TC,FY

## 2022-10-17 PROCEDURE — 1159F MED LIST DOCD IN RCRD: CPT | Mod: CPTII,S$GLB,, | Performed by: NURSE PRACTITIONER

## 2022-10-17 PROCEDURE — 99999 PR PBB SHADOW E&M-EST. PATIENT-LVL IV: CPT | Mod: PBBFAC,,, | Performed by: NURSE PRACTITIONER

## 2022-10-17 PROCEDURE — 74018 RADEX ABDOMEN 1 VIEW: CPT | Mod: 26,,, | Performed by: RADIOLOGY

## 2022-10-17 PROCEDURE — 99204 OFFICE O/P NEW MOD 45 MIN: CPT | Mod: S$GLB,,, | Performed by: NURSE PRACTITIONER

## 2022-10-17 RX ORDER — SODIUM, POTASSIUM,MAG SULFATES 17.5-3.13G
1 SOLUTION, RECONSTITUTED, ORAL ORAL DAILY
Qty: 1 KIT | Refills: 0 | Status: ON HOLD | OUTPATIENT
Start: 2022-10-17 | End: 2023-04-27 | Stop reason: CLARIF

## 2022-10-17 NOTE — PROGRESS NOTES
GASTROENTEROLOGY CLINIC NOTE    Chief Complaint: The primary encounter diagnosis was Screening for colon cancer. A diagnosis of Diarrhea, unspecified type was also pertinent to this visit.  Referring provider/PCP: Primary Doctor No    HPI:  Veronica Ash is a 62 y.o. female who is a new patient to me with a PMH of GERD, hypothyroidism, and depression. She is here today to establish care for colon cancer screening and loose bowel movements.  This is not a new problem as she reports it has been ongoing for several years but has worsened over the last year with urgency and accidents.  Daily bowel movements fluctuate between 3-4 per day to one per day to none.  She does feel as if she is not completely emptying with bowel movements.  Consistency of stool is loose/soft. Bowel movements are accompanied by increased urgency (sometimes will not make it to restroom) and occasionally will notice oily stool.  Mid to lower cramping with bowel movements.  Denies mucus in stool, undigested food in stool, melena, hematochezia, or nocturnal bowel movements.  Bowel movements occur randomly throughout the day and are not always post prandial.  Does not take daily fiber supplement. No recent changes in medications or new medications. Of note, patient does take Celexa and Mobic daily.     Treatments Tried:None  NSAIDs: Mobic daily  Anticoagulation or Antiplatelet: No      History of H.pylori:  H.pylori Treatment:  Prior Upper Endoscopy: In her 20's; treated for stomach ulcer  Prior Colonoscopy:7 years ago; 1 polyp removed. Due for screening  Family h/o Colon Cancer: Father   Mother with cancer of small intestine  Family h/o Crohn's Disease or Ulcerative Colitis: No  Family h/o Celiac Sprue: No  Abdominal Surgeries: cholecystectomy    Review of Systems   Constitutional:  Negative for weight loss.   HENT:  Negative for sore throat.    Eyes:  Negative for blurred vision.   Respiratory:  Negative for cough.    Cardiovascular:   Negative for chest pain.   Gastrointestinal:  Positive for diarrhea. Negative for abdominal pain, blood in stool, constipation, heartburn, melena, nausea and vomiting.   Genitourinary:  Negative for dysuria.   Musculoskeletal:  Negative for myalgias.   Skin:  Negative for rash.   Neurological:  Negative for headaches.   Endo/Heme/Allergies:  Negative for environmental allergies.   Psychiatric/Behavioral:  Negative for suicidal ideas. The patient is not nervous/anxious.      Past Medical History: has a past medical history of Abnormal Pap smear of cervix, Depression, GERD (gastroesophageal reflux disease), Melanoma, Thyroid disease, and Vertigo.    Past Surgical History: has a past surgical history that includes Cholecystectomy; Excision of melanoma (Left); and Gynecologic cryosurgery (1980).    Family History:family history includes Brain cancer in her mother; Lung cancer in her father; Ovarian cancer in her sister; Stomach cancer in her mother.    Allergies:   Review of patient's allergies indicates:   Allergen Reactions    Aspirin      Other reaction(s): whelps & swelling    Sulfa (sulfonamide antibiotics) Rash       Social History: reports that she has never smoked. She has never used smokeless tobacco. She reports current alcohol use. She reports that she does not use drugs.    Home medications:   Current Outpatient Medications on File Prior to Visit   Medication Sig Dispense Refill    albuterol (PROVENTIL/VENTOLIN HFA) 90 mcg/actuation inhaler Inhale 1-2 puffs into the lungs every 6 (six) hours as needed for Wheezing. 18 g 0    citalopram (CELEXA) 40 MG tablet Take 1 tablet (40 mg total) by mouth once daily. 90 tablet 1    clobetasol 0.05% (TEMOVATE) 0.05 % Oint Apply topically 2 (two) times daily. 45 g 1    levothyroxine (SYNTHROID) 100 MCG tablet Take 1 tablet (100 mcg total) by mouth before breakfast. 90 tablet 1    meclizine (ANTIVERT) 25 mg tablet Take 1 tablet (25 mg total) by mouth 3 (three) times daily  "as needed for Dizziness or Nausea. 60 tablet 1    meloxicam (MOBIC) 15 MG tablet Take 15 mg by mouth once daily.      prasterone, dhea, (INTRAROSA) 6.5 mg Inst Place 6.5 mg vaginally every evening. 30 each 6    progesterone (PROMETRIUM) 200 MG capsule Take 1 capsule (200 mg total) by mouth nightly. 30 capsule 11     No current facility-administered medications on file prior to visit.       Vital signs:  Ht 5' 4" (1.626 m)   Wt 64.7 kg (142 lb 10.2 oz)   BMI 24.48 kg/m²     Physical Exam  Vitals reviewed.   Constitutional:       General: She is not in acute distress.     Appearance: Normal appearance. She is not ill-appearing.   HENT:      Head: Normocephalic.   Cardiovascular:      Rate and Rhythm: Normal rate and regular rhythm.      Heart sounds: Normal heart sounds. No murmur heard.  Pulmonary:      Effort: Pulmonary effort is normal. No respiratory distress.      Breath sounds: Normal breath sounds.   Chest:      Chest wall: No tenderness.   Abdominal:      General: Bowel sounds are normal. There is no distension.      Palpations: Abdomen is soft.      Tenderness: There is no abdominal tenderness. Negative signs include Akbar's sign.      Hernia: No hernia is present.   Skin:     General: Skin is warm.   Neurological:      Mental Status: She is alert and oriented to person, place, and time.   Psychiatric:         Mood and Affect: Mood normal.         Behavior: Behavior normal.       Routine labs:  No results found for: WBC, HGB, HCT, MCV, PLT  No results found for: INR  No results found for: IRON, FERRITIN, TIBC, FESATURATED  No results found for: NA, K, CL, CO2, BUN, CREATININE, GLUF  No results found for: ALBUMIN, ALT, AST, GGT, ALKPHOS, BILITOT  No results found for: GLUCOSE  No results found for: TSH  No results found for: CALCIUM, PHOS    Imaging:  X-Ray Chest AP Portable  Narrative: EXAMINATION:  XR CHEST AP PORTABLE    CLINICAL HISTORY:  cough;    TECHNIQUE:  Single frontal view of the chest was " performed.    COMPARISON:  Left rib radiograph 07/05/2019    FINDINGS:  ECG monitoring leads overlie the chest.The lungs are clear, with normal appearance of pulmonary vasculature and no pleural effusion or pneumothorax.    The cardiac silhouette is enlarged.  The hilar and mediastinal contours are unremarkable.    Multiple prior left rib fractures noted.  There is degenerative change of the spine.  No definite acute osseous abnormality.  Cholecystectomy clips are noted.  Impression: No acute cardiopulmonary abnormality.    Electronically signed by: Kareen Valdez  Date:    06/22/2022  Time:    10:23      I have reviewed prior labs, imaging, and notes.      Assessment:  1. Screening for colon cancer    2. Diarrhea, unspecified type        Plan:  Orders Placed This Encounter    Stool culture    X-Ray Abdomen AP 1 View    Calprotectin, Stool    Giardia / Cryptosporidum, EIA    H. pylori antigen, stool    Pancreatic elastase, fecal    Rotavirus antigen, stool    Stool Exam-Ova,Cysts,Parasites    WBC, Stool    sodium,potassium,mag sulfates (SUPREP BOWEL PREP KIT) 17.5-3.13-1.6 gram SolR    Case Request Endoscopy: COLONOSCOPY     Stool Studies  Abdominal xray to evaluate for overflow diarrhea and stool burden  Colonoscopy for colon cancer screening. Suprep.  Consider biopsy for microscopic colitis as patient taking SSRI and NSAID  Metamucil daily    Plan of care discussed with patient who is in agreement and verbalized understanding.     I have explained the planned procedures to the patient.The risks, benefits and alternatives of the procedure were also explained in detail. Patient verbalized understanding, all questions were answered. The patient agrees to proceed as planned    Follow Up: As Needed Pending Above Workup          Kalli Medina, APRN,FNP-BC  [facility] [place]    (Portions of this note were dictated using voice recognition software and may contain dictation related  errors in spelling/grammar/syntax not found on text review)

## 2022-10-17 NOTE — PATIENT INSTRUCTIONS
SUPREP Instructions    [facility] [address]  Clinic Office [phone]  Endoscopy Lab [phone]    You are scheduled for a Colonoscopy with Dr. Powers  on 12/19/2022 at [facility] [place].    Check in at the Hospital -1st floor, Information desk.   Call [phone] to reschedule.    An adult friend/family member must come with you to drive you home.  You cannot drive, take a taxi, Uber/Lyft or bus to leave the Endoscopy Center alone.  If you do not have someone to drive you home, your test will be cancelled.     Please follow the directions of your doctor if you take any pills that thin your blood. If you take these meds: Aggrenox, Brilinta, Effient, Eliquis, Lovenox, Plavix, Pletal, Pradaxa, Ticilid, Xarelto or Coumadin, let the doctor's office know.    DON'T: On the morning of the test do not take insulin or pills for diabetes.     DO: On the morning of the test, do take any pills for blood pressure, heart, anti-rejection and or seizures with a small sip of water. Bring any inhalers with you.    To have a good prep, you must follow these instructions - please do not use the directions from the pharmacy.    The doctor will send a prescription for the SUPREP.      The Day Before the test:    You can only drink CLEAR LIQUIDS the whole day before your test.  You can't eat any food for the whole day.    You CAN have:  Water, Coffee or decaf coffee (no milk or cream)  Tea  Soft drinks - regular and sugar free  Jello (green or yellow)  Apple Juice, white grape juice, white cranberry juice  Gatorade, Power Aid, Crystal Light, Rao Aid  Lemonade and Limeade  Bouillon, clear soup  Snowball, popsicles  YOU CAN'T DRINK ANYTHING RED, PURPLE ORANGE OR BLUE   YOU CAN'T DRINK ALCOHOL  ONLY DRINK WHAT IS ON THE LIST      At 5 pm the night before your test:    Pour the 1st bottle of SUPREP into the cup provided in the box. Add water to the line on the cup and mix well.  Drink the whole  cup and then drink 2 more full cups of water over 1 hour.  This can be easier to drink if it is cold. You can mix it 20 minutes ahead of time and place in the refrigerator before you drink it.  You must drink it within 30-45 minutes of mixing it.  Do NOT pour the drink over ice.  You can drink it with a straw.    The Day of the test - We will call you 2 days before your test to tell you what time to get there.    5 hours before you come to the hospital (this may be in the middle of the night)  Pour the 2nd bottle of SUPREP into the cup provided in the box. Add water to the line on the cup and mix well.  Drink the whole cup and then drink 2 more full cups of water over 1 hour.  It might be easier to drink if it is cold. You can mix it 20 minutes ahead of time and place in the refrigerator before you drink it.  You must drink it within 30-45 minutes of mixing it.  Do NOT pour the drink over ice.  You can drink it with a straw.    YOU CAN'T EAT OR DRINK ANYTHING ELSE ONCE YOU FINISH THE PREP    Leave all valuables and jewelry at home. You will be at the hospital for 2-4 hours.    Call the Endoscopy department at [phone] with any questions about your procedure.

## 2022-10-18 ENCOUNTER — TELEPHONE (OUTPATIENT)
Dept: GASTROENTEROLOGY | Facility: CLINIC | Age: 62
End: 2022-10-18
Payer: COMMERCIAL

## 2022-10-18 ENCOUNTER — LAB VISIT (OUTPATIENT)
Dept: LAB | Facility: HOSPITAL | Age: 62
End: 2022-10-18
Attending: FAMILY MEDICINE
Payer: COMMERCIAL

## 2022-10-18 DIAGNOSIS — Z11.4 SCREENING FOR HIV (HUMAN IMMUNODEFICIENCY VIRUS): ICD-10-CM

## 2022-10-18 DIAGNOSIS — Z76.89 ENCOUNTER TO ESTABLISH CARE WITH NEW DOCTOR: ICD-10-CM

## 2022-10-18 DIAGNOSIS — Z11.59 ENCOUNTER FOR HEPATITIS C SCREENING TEST FOR LOW RISK PATIENT: ICD-10-CM

## 2022-10-18 LAB
ALBUMIN SERPL BCP-MCNC: 4.4 G/DL (ref 3.5–5.2)
ALP SERPL-CCNC: 60 U/L (ref 38–126)
ALT SERPL W/O P-5'-P-CCNC: 16 U/L (ref 10–44)
ANION GAP SERPL CALC-SCNC: 7 MMOL/L (ref 8–16)
AST SERPL-CCNC: 28 U/L (ref 15–46)
BASOPHILS # BLD AUTO: 0.08 K/UL (ref 0–0.2)
BASOPHILS NFR BLD: 1.2 % (ref 0–1.9)
BILIRUB SERPL-MCNC: 0.4 MG/DL (ref 0.1–1)
CALCIUM SERPL-MCNC: 9.3 MG/DL (ref 8.7–10.5)
CHLORIDE SERPL-SCNC: 104 MMOL/L (ref 95–110)
CHOLEST SERPL-MCNC: 247 MG/DL (ref 120–199)
CHOLEST/HDLC SERPL: 2.6 {RATIO} (ref 2–5)
CO2 SERPL-SCNC: 30 MMOL/L (ref 23–29)
CREAT SERPL-MCNC: 0.65 MG/DL (ref 0.5–1.4)
DIFFERENTIAL METHOD: ABNORMAL
EOSINOPHIL # BLD AUTO: 0.5 K/UL (ref 0–0.5)
EOSINOPHIL NFR BLD: 7.1 % (ref 0–8)
ERYTHROCYTE [DISTWIDTH] IN BLOOD BY AUTOMATED COUNT: 11.5 % (ref 11.5–14.5)
EST. GFR  (NO RACE VARIABLE): >60 ML/MIN/1.73 M^2
GLUCOSE SERPL-MCNC: 88 MG/DL (ref 70–110)
HCT VFR BLD AUTO: 38.5 % (ref 37–48.5)
HDLC SERPL-MCNC: 94 MG/DL (ref 40–75)
HDLC SERPL: 38.1 % (ref 20–50)
HGB BLD-MCNC: 12.9 G/DL (ref 12–16)
IMM GRANULOCYTES # BLD AUTO: 0.01 K/UL (ref 0–0.04)
IMM GRANULOCYTES NFR BLD AUTO: 0.2 % (ref 0–0.5)
LDLC SERPL CALC-MCNC: 141.4 MG/DL (ref 63–159)
LYMPHOCYTES # BLD AUTO: 2.4 K/UL (ref 1–4.8)
LYMPHOCYTES NFR BLD: 36.7 % (ref 18–48)
MCH RBC QN AUTO: 32.3 PG (ref 27–31)
MCHC RBC AUTO-ENTMCNC: 33.5 G/DL (ref 32–36)
MCV RBC AUTO: 96 FL (ref 82–98)
MONOCYTES # BLD AUTO: 0.4 K/UL (ref 0.3–1)
MONOCYTES NFR BLD: 6 % (ref 4–15)
NEUTROPHILS # BLD AUTO: 3.2 K/UL (ref 1.8–7.7)
NEUTROPHILS NFR BLD: 48.8 % (ref 38–73)
NONHDLC SERPL-MCNC: 153 MG/DL
NRBC BLD-RTO: 0 /100 WBC
PLATELET # BLD AUTO: 300 K/UL (ref 150–450)
PMV BLD AUTO: 9.4 FL (ref 9.2–12.9)
POTASSIUM SERPL-SCNC: 4.6 MMOL/L (ref 3.5–5.1)
PROT SERPL-MCNC: 6.8 G/DL (ref 6–8.4)
RBC # BLD AUTO: 4 M/UL (ref 4–5.4)
SODIUM SERPL-SCNC: 141 MMOL/L (ref 136–145)
T4 FREE SERPL-MCNC: 0.82 NG/DL (ref 0.71–1.51)
TRIGL SERPL-MCNC: 58 MG/DL (ref 30–150)
TSH SERPL DL<=0.005 MIU/L-ACNC: 12.4 UIU/ML (ref 0.4–4)
UUN UR-MCNC: 17 MG/DL (ref 7–17)
WBC # BLD AUTO: 6.52 K/UL (ref 3.9–12.7)

## 2022-10-18 PROCEDURE — 80061 LIPID PANEL: CPT | Performed by: FAMILY MEDICINE

## 2022-10-18 PROCEDURE — 84439 ASSAY OF FREE THYROXINE: CPT | Performed by: FAMILY MEDICINE

## 2022-10-18 PROCEDURE — 84443 ASSAY THYROID STIM HORMONE: CPT | Mod: PO | Performed by: FAMILY MEDICINE

## 2022-10-18 PROCEDURE — 36415 COLL VENOUS BLD VENIPUNCTURE: CPT | Mod: PO | Performed by: FAMILY MEDICINE

## 2022-10-18 PROCEDURE — 85025 COMPLETE CBC W/AUTO DIFF WBC: CPT | Mod: PO | Performed by: FAMILY MEDICINE

## 2022-10-18 PROCEDURE — 80053 COMPREHEN METABOLIC PANEL: CPT | Mod: PO | Performed by: FAMILY MEDICINE

## 2022-10-18 PROCEDURE — 86803 HEPATITIS C AB TEST: CPT | Mod: PO | Performed by: FAMILY MEDICINE

## 2022-10-18 PROCEDURE — 87389 HIV-1 AG W/HIV-1&-2 AB AG IA: CPT | Mod: PO | Performed by: FAMILY MEDICINE

## 2022-10-18 NOTE — TELEPHONE ENCOUNTER
Spoke with pt regarding results and orders. She verbalized understanding and thanked me for the call.

## 2022-10-19 ENCOUNTER — LAB VISIT (OUTPATIENT)
Dept: LAB | Facility: HOSPITAL | Age: 62
End: 2022-10-19
Attending: NURSE PRACTITIONER
Payer: COMMERCIAL

## 2022-10-19 DIAGNOSIS — R19.7 DIARRHEA, UNSPECIFIED TYPE: ICD-10-CM

## 2022-10-19 LAB
HCV AB SERPL QL IA: NORMAL
HIV 1+2 AB+HIV1 P24 AG SERPL QL IA: NORMAL

## 2022-10-19 PROCEDURE — 87045 FECES CULTURE AEROBIC BACT: CPT | Mod: PO | Performed by: NURSE PRACTITIONER

## 2022-10-19 PROCEDURE — 87046 STOOL CULTR AEROBIC BACT EA: CPT | Mod: PO | Performed by: NURSE PRACTITIONER

## 2022-10-19 PROCEDURE — 87209 SMEAR COMPLEX STAIN: CPT | Mod: PO | Performed by: NURSE PRACTITIONER

## 2022-10-19 PROCEDURE — 82656 EL-1 FECAL QUAL/SEMIQ: CPT | Mod: PO | Performed by: NURSE PRACTITIONER

## 2022-10-19 PROCEDURE — 87338 HPYLORI STOOL AG IA: CPT | Mod: PO | Performed by: NURSE PRACTITIONER

## 2022-10-19 PROCEDURE — 87427 SHIGA-LIKE TOXIN AG IA: CPT | Mod: 59,PO | Performed by: NURSE PRACTITIONER

## 2022-10-19 PROCEDURE — 87425 ROTAVIRUS AG IA: CPT | Mod: PO | Performed by: NURSE PRACTITIONER

## 2022-10-19 PROCEDURE — 83993 ASSAY FOR CALPROTECTIN FECAL: CPT | Mod: PO | Performed by: NURSE PRACTITIONER

## 2022-10-19 PROCEDURE — 87329 GIARDIA AG IA: CPT | Mod: PO | Performed by: NURSE PRACTITIONER

## 2022-10-19 PROCEDURE — 87177 OVA AND PARASITES SMEARS: CPT | Mod: PO | Performed by: NURSE PRACTITIONER

## 2022-10-19 PROCEDURE — 89055 LEUKOCYTE ASSESSMENT FECAL: CPT | Mod: PO | Performed by: NURSE PRACTITIONER

## 2022-10-20 ENCOUNTER — TELEPHONE (OUTPATIENT)
Dept: OBSTETRICS AND GYNECOLOGY | Facility: CLINIC | Age: 62
End: 2022-10-20
Payer: COMMERCIAL

## 2022-10-20 LAB
CRYPTOSP AG STL QL IA: NEGATIVE
E COLI SXT1 STL QL IA: NEGATIVE
E COLI SXT2 STL QL IA: NEGATIVE
G LAMBLIA AG STL QL IA: NEGATIVE
RV AG STL QL IA.RAPID: NEGATIVE
WBC #/AREA STL HPF: ABNORMAL /[HPF]

## 2022-10-20 NOTE — TELEPHONE ENCOUNTER
Contacted patient Veronica Ash today, 10/20/2022, at approximately 5:24 PM. Two patient identifiers confirmed.   Discussed biopsy result. Discussed surrounding inflammation/irritation can sometimes contribute to the multiple possible diagnoses listed in path report, however, important to note no description of atypical cells, precancerous changes, or cancerous changes. Overall concern for lichen sclerosus.  Patient reports still some burning, but area is overall improved and feels better. Using steroid cream - compliant. Discussed this area will need to be monitored serially. SHM to clinic staff to help assist with scheduling patient in about 2 months for visit. Patient verbalized understanding. Phone call disconnected.

## 2022-10-21 ENCOUNTER — TELEPHONE (OUTPATIENT)
Dept: OBSTETRICS AND GYNECOLOGY | Facility: CLINIC | Age: 62
End: 2022-10-21
Payer: COMMERCIAL

## 2022-10-21 ENCOUNTER — PATIENT MESSAGE (OUTPATIENT)
Dept: FAMILY MEDICINE | Facility: CLINIC | Age: 62
End: 2022-10-21
Payer: COMMERCIAL

## 2022-10-21 DIAGNOSIS — E03.9 HYPOTHYROIDISM, UNSPECIFIED TYPE: Primary | ICD-10-CM

## 2022-10-21 LAB — ELASTASE 1, FECAL: 365 MCG/G

## 2022-10-21 RX ORDER — LEVOTHYROXINE SODIUM 112 UG/1
112 TABLET ORAL
Qty: 30 TABLET | Refills: 3 | Status: SHIPPED | OUTPATIENT
Start: 2022-10-21 | End: 2023-06-25 | Stop reason: SDUPTHER

## 2022-10-21 NOTE — TELEPHONE ENCOUNTER
----- Message from Janine Canela MD sent at 10/20/2022  5:30 PM CDT -----  Please make 2 month follow up appointment for lichen sclerosus. Thank you!

## 2022-10-22 LAB
BACTERIA STL CULT: NORMAL
O+P STL MICRO: NORMAL

## 2022-10-25 ENCOUNTER — APPOINTMENT (OUTPATIENT)
Dept: RADIOLOGY | Facility: OTHER | Age: 62
End: 2022-10-25
Attending: OBSTETRICS & GYNECOLOGY
Payer: COMMERCIAL

## 2022-10-25 VITALS — WEIGHT: 142.63 LBS | BODY MASS INDEX: 24.35 KG/M2 | HEIGHT: 64 IN

## 2022-10-25 DIAGNOSIS — Z12.31 VISIT FOR SCREENING MAMMOGRAM: ICD-10-CM

## 2022-10-25 PROCEDURE — 77067 SCR MAMMO BI INCL CAD: CPT | Mod: 26,,, | Performed by: RADIOLOGY

## 2022-10-25 PROCEDURE — 77063 MAMMO DIGITAL SCREENING BILAT WITH TOMO: ICD-10-PCS | Mod: 26,,, | Performed by: RADIOLOGY

## 2022-10-25 PROCEDURE — 77063 BREAST TOMOSYNTHESIS BI: CPT | Mod: 26,,, | Performed by: RADIOLOGY

## 2022-10-25 PROCEDURE — 77063 BREAST TOMOSYNTHESIS BI: CPT | Mod: TC,PN

## 2022-10-25 PROCEDURE — 77067 MAMMO DIGITAL SCREENING BILAT WITH TOMO: ICD-10-PCS | Mod: 26,,, | Performed by: RADIOLOGY

## 2022-10-26 LAB
CALPROTECTIN STL-MCNT: 32.8 MCG/G
H PYLORI AG STL QL IA: NOT DETECTED
SPECIMEN SOURCE: 0

## 2022-12-01 ENCOUNTER — TELEPHONE (OUTPATIENT)
Dept: ENDOSCOPY | Facility: HOSPITAL | Age: 62
End: 2022-12-01
Payer: COMMERCIAL

## 2022-12-01 NOTE — TELEPHONE ENCOUNTER
Veronica Ash left msg, cx'd procedure 12/19/2022 called a verified states will call back after Mian to reschedule

## 2022-12-22 ENCOUNTER — NURSE TRIAGE (OUTPATIENT)
Dept: ADMINISTRATIVE | Facility: CLINIC | Age: 62
End: 2022-12-22
Payer: COMMERCIAL

## 2023-01-10 ENCOUNTER — OFFICE VISIT (OUTPATIENT)
Dept: FAMILY MEDICINE | Facility: CLINIC | Age: 63
End: 2023-01-10
Payer: COMMERCIAL

## 2023-01-10 ENCOUNTER — PATIENT MESSAGE (OUTPATIENT)
Dept: FAMILY MEDICINE | Facility: CLINIC | Age: 63
End: 2023-01-10

## 2023-01-10 ENCOUNTER — LAB VISIT (OUTPATIENT)
Dept: LAB | Facility: HOSPITAL | Age: 63
End: 2023-01-10
Attending: FAMILY MEDICINE
Payer: COMMERCIAL

## 2023-01-10 VITALS
TEMPERATURE: 98 F | OXYGEN SATURATION: 98 % | BODY MASS INDEX: 23.37 KG/M2 | SYSTOLIC BLOOD PRESSURE: 142 MMHG | HEIGHT: 64 IN | DIASTOLIC BLOOD PRESSURE: 82 MMHG | HEART RATE: 59 BPM | WEIGHT: 136.88 LBS

## 2023-01-10 DIAGNOSIS — K52.9 CHRONIC DIARRHEA: ICD-10-CM

## 2023-01-10 DIAGNOSIS — E03.9 HYPOTHYROIDISM, UNSPECIFIED TYPE: ICD-10-CM

## 2023-01-10 DIAGNOSIS — Z09 FOLLOW-UP EXAM: Primary | ICD-10-CM

## 2023-01-10 DIAGNOSIS — F32.A DEPRESSION, UNSPECIFIED DEPRESSION TYPE: ICD-10-CM

## 2023-01-10 DIAGNOSIS — R42 VERTIGO: ICD-10-CM

## 2023-01-10 DIAGNOSIS — M54.6 CHRONIC RIGHT-SIDED THORACIC BACK PAIN: ICD-10-CM

## 2023-01-10 DIAGNOSIS — G89.29 CHRONIC RIGHT-SIDED THORACIC BACK PAIN: ICD-10-CM

## 2023-01-10 LAB — TSH SERPL DL<=0.005 MIU/L-ACNC: 2.47 UIU/ML (ref 0.4–4)

## 2023-01-10 PROCEDURE — 99214 OFFICE O/P EST MOD 30 MIN: CPT | Mod: S$GLB,,, | Performed by: FAMILY MEDICINE

## 2023-01-10 PROCEDURE — 3077F SYST BP >= 140 MM HG: CPT | Mod: CPTII,S$GLB,, | Performed by: FAMILY MEDICINE

## 2023-01-10 PROCEDURE — 84443 ASSAY THYROID STIM HORMONE: CPT | Performed by: FAMILY MEDICINE

## 2023-01-10 PROCEDURE — 3008F PR BODY MASS INDEX (BMI) DOCUMENTED: ICD-10-PCS | Mod: CPTII,S$GLB,, | Performed by: FAMILY MEDICINE

## 2023-01-10 PROCEDURE — 3077F PR MOST RECENT SYSTOLIC BLOOD PRESSURE >= 140 MM HG: ICD-10-PCS | Mod: CPTII,S$GLB,, | Performed by: FAMILY MEDICINE

## 2023-01-10 PROCEDURE — 99214 PR OFFICE/OUTPT VISIT, EST, LEVL IV, 30-39 MIN: ICD-10-PCS | Mod: S$GLB,,, | Performed by: FAMILY MEDICINE

## 2023-01-10 PROCEDURE — 1159F PR MEDICATION LIST DOCUMENTED IN MEDICAL RECORD: ICD-10-PCS | Mod: CPTII,S$GLB,, | Performed by: FAMILY MEDICINE

## 2023-01-10 PROCEDURE — 3008F BODY MASS INDEX DOCD: CPT | Mod: CPTII,S$GLB,, | Performed by: FAMILY MEDICINE

## 2023-01-10 PROCEDURE — 1159F MED LIST DOCD IN RCRD: CPT | Mod: CPTII,S$GLB,, | Performed by: FAMILY MEDICINE

## 2023-01-10 PROCEDURE — 3079F PR MOST RECENT DIASTOLIC BLOOD PRESSURE 80-89 MM HG: ICD-10-PCS | Mod: CPTII,S$GLB,, | Performed by: FAMILY MEDICINE

## 2023-01-10 PROCEDURE — 99999 PR PBB SHADOW E&M-EST. PATIENT-LVL IV: ICD-10-PCS | Mod: PBBFAC,,, | Performed by: FAMILY MEDICINE

## 2023-01-10 PROCEDURE — 3079F DIAST BP 80-89 MM HG: CPT | Mod: CPTII,S$GLB,, | Performed by: FAMILY MEDICINE

## 2023-01-10 PROCEDURE — 36415 COLL VENOUS BLD VENIPUNCTURE: CPT | Performed by: FAMILY MEDICINE

## 2023-01-10 PROCEDURE — 99999 PR PBB SHADOW E&M-EST. PATIENT-LVL IV: CPT | Mod: PBBFAC,,, | Performed by: FAMILY MEDICINE

## 2023-01-10 RX ORDER — METHOCARBAMOL 500 MG/1
500 TABLET, FILM COATED ORAL 3 TIMES DAILY PRN
Qty: 30 TABLET | Refills: 0 | Status: SHIPPED | OUTPATIENT
Start: 2023-01-10 | End: 2023-01-20

## 2023-01-10 NOTE — PROGRESS NOTES
(Portions of this note were dictated using voice recognition software and may contain dictation related errors in spelling/grammar/syntax not found on text review)    CC:   Chief Complaint   Patient presents with    Follow-up     3 month       HPI: 62 y.o. female presented for follow up visit, last seen by me 10/2022 as new pt to establish care. She has medical history significant for depression, GERD, hypothyroidism, vertigo. She takes synthyroid 112 mcg, Celexa 40 mg.  Pt has chronic diarrhea with almost every food she eats, she was referred to GI and had stool studies done along with abdominal x ray, has to schedule colonoscopy. She has made changes in her diet, eliminate dairy and sugars, started metamucil, reports bowel movements are more solid now, she had no bowel accident since she made changes. She reports having right sided thoracic pain, ongoing for past few months, it is intermittent, describes as muscle tightness and discomfort, non radiating, no associated numbness or tingling, she denies trauma/fall, discomfort increases with lifting and pulling things, she has tried muscle relaxants in the past with good relief, currently out of it. 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  She does not smoke, has no toxic habits. She denies having cough, SOB, chest pain, N/V, abdominal pain, urine problems including burning and dysurea    Past Medical History:   Diagnosis Date    Abnormal Pap smear of cervix     Depression     GERD (gastroesophageal reflux disease)     Melanoma     Thyroid disease     Vertigo        Past Surgical History:   Procedure Laterality Date    CHOLECYSTECTOMY      EXCISION OF MELANOMA Left     ankle with removal of 2 lymph nodes    GYNECOLOGIC CRYOSURGERY  1980       Family History   Problem Relation Age of Onset    Stomach cancer Mother     Brain cancer Mother     Lung cancer Father     Ovarian cancer Sister 67       Social History     Tobacco Use     Smoking status: Never    Smokeless tobacco: Never   Substance Use Topics    Alcohol use: Yes     Comment: occasion    Drug use: Never       Lab Results   Component Value Date    WBC 6.52 10/18/2022    HGB 12.9 10/18/2022    HCT 38.5 10/18/2022    MCV 96 10/18/2022     10/18/2022    CHOL 247 (H) 10/18/2022    TRIG 58 10/18/2022    HDL 94 (H) 10/18/2022    ALT 16 10/18/2022    AST 28 10/18/2022    BILITOT 0.4 10/18/2022    ALKPHOS 60 10/18/2022     10/18/2022    K 4.6 10/18/2022     10/18/2022    CREATININE 0.65 10/18/2022    CALCIUM 9.3 10/18/2022    ALBUMIN 4.4 10/18/2022    BUN 17 10/18/2022    CO2 30 (H) 10/18/2022    TSH 12.400 (H) 10/18/2022    LDLCALC 141.4 10/18/2022    GLU 88 10/18/2022    [id number] 35 12/03/2021             Vital signs reviewed  PE:   APPEARANCE: Well nourished, well developed, in no acute distress.    HEAD: Normocephalic, atraumatic.  EYES: EOMI.  Conjunctivae noninjected.  NECK: Supple with no cervical lymphadenopathy.    CHEST: Good inspiratory effort. Lungs clear to auscultation with no wheezes or crackles.  CARDIOVASCULAR: Normal S1, S2. No rubs, murmurs, or gallops.  ABDOMEN: Bowel sounds normal. Not distended. Soft. No tenderness or masses. No organomegaly.  EXTREMITIES: No edema, cyanosis, or clubbing.  BACK: No TTP on area of concern on right thoracic region, no skin changes, ROM normal    Review of Systems   Constitutional:  Negative for chills, fatigue and fever.   HENT: Negative.     Respiratory:  Negative for cough, shortness of breath and wheezing.    Cardiovascular:  Negative for chest pain, palpitations and leg swelling.   Gastrointestinal:  Negative for abdominal pain, change in bowel habit, constipation, diarrhea, nausea, vomiting, reflux and change in bowel habit.   Genitourinary: Negative.    Musculoskeletal:  Positive for back pain. Negative for joint swelling, leg pain, neck pain, neck stiffness and joint deformity.   Neurological: Negative.     Psychiatric/Behavioral: Negative.     All other systems reviewed and are negative.    IMPRESSION  1. Follow-up exam    2. Hypothyroidism, unspecified type    3. Chronic right-sided thoracic back pain    4. Depression, unspecified depression type    5. Vertigo    6. Chronic diarrhea          PLAN      1. Hypothyroidism, unspecified type    Last tsh elevated to 12.4, dose of synthyroid increased to 112 mcg, repeat tsh today    - TSH; Future      2. Chronic right-sided thoracic back pain  Seems like MSK    - methocarbamoL (ROBAXIN) 500 MG Tab; Take 1 tablet (500 mg total) by mouth 3 (three) times daily as needed (muscle spasm, back pain).  Dispense: 30 tablet; Refill: 0    Gentle back stretching exercises advised, printed and given to her, discussed physical therapy, she wants to try home exercises first      3. Follow-up exam        4. Depression, unspecified depression type    Continue Celexa 40 mg      5. Vertigo  Stable    Continue meclizine as needed       6. Chronic Diarrhea    Followed by GI  Stool studies, abd x ray reviewed  Continue metamucil        SCREENINGS      Immunizations:   UTD with Covid and flu vaccines  Tdap 7/2022      Age/demographic appropriate health maintenance:    UTD with mammogram, pap smear and colonoscopy      Health Maintenance Due   Topic Date Due    COVID-19 Vaccine (1) 03/12/1961           Yohannes Arevalo   1/10/2023

## 2023-01-30 ENCOUNTER — OFFICE VISIT (OUTPATIENT)
Dept: URGENT CARE | Facility: CLINIC | Age: 63
End: 2023-01-30
Payer: COMMERCIAL

## 2023-01-30 VITALS
WEIGHT: 134 LBS | HEIGHT: 64 IN | BODY MASS INDEX: 22.88 KG/M2 | RESPIRATION RATE: 20 BRPM | OXYGEN SATURATION: 95 % | TEMPERATURE: 99 F | SYSTOLIC BLOOD PRESSURE: 95 MMHG | DIASTOLIC BLOOD PRESSURE: 67 MMHG | HEART RATE: 71 BPM

## 2023-01-30 DIAGNOSIS — J02.0 STREP PHARYNGITIS: Primary | ICD-10-CM

## 2023-01-30 LAB
CTP QC/QA: YES
CTP QC/QA: YES
MOLECULAR STREP A: POSITIVE
SARS-COV-2 AG RESP QL IA.RAPID: NEGATIVE

## 2023-01-30 PROCEDURE — 1159F MED LIST DOCD IN RCRD: CPT | Mod: CPTII,S$GLB,, | Performed by: FAMILY MEDICINE

## 2023-01-30 PROCEDURE — 3074F PR MOST RECENT SYSTOLIC BLOOD PRESSURE < 130 MM HG: ICD-10-PCS | Mod: CPTII,S$GLB,, | Performed by: FAMILY MEDICINE

## 2023-01-30 PROCEDURE — 99214 OFFICE O/P EST MOD 30 MIN: CPT | Mod: S$GLB,,, | Performed by: FAMILY MEDICINE

## 2023-01-30 PROCEDURE — 1159F PR MEDICATION LIST DOCUMENTED IN MEDICAL RECORD: ICD-10-PCS | Mod: CPTII,S$GLB,, | Performed by: FAMILY MEDICINE

## 2023-01-30 PROCEDURE — 3078F PR MOST RECENT DIASTOLIC BLOOD PRESSURE < 80 MM HG: ICD-10-PCS | Mod: CPTII,S$GLB,, | Performed by: FAMILY MEDICINE

## 2023-01-30 PROCEDURE — 3078F DIAST BP <80 MM HG: CPT | Mod: CPTII,S$GLB,, | Performed by: FAMILY MEDICINE

## 2023-01-30 PROCEDURE — 3008F BODY MASS INDEX DOCD: CPT | Mod: CPTII,S$GLB,, | Performed by: FAMILY MEDICINE

## 2023-01-30 PROCEDURE — 87811 SARS-COV-2 COVID19 W/OPTIC: CPT | Mod: QW,S$GLB,, | Performed by: FAMILY MEDICINE

## 2023-01-30 PROCEDURE — 3008F PR BODY MASS INDEX (BMI) DOCUMENTED: ICD-10-PCS | Mod: CPTII,S$GLB,, | Performed by: FAMILY MEDICINE

## 2023-01-30 PROCEDURE — 1160F RVW MEDS BY RX/DR IN RCRD: CPT | Mod: CPTII,S$GLB,, | Performed by: FAMILY MEDICINE

## 2023-01-30 PROCEDURE — 87811 SARS CORONAVIRUS 2 ANTIGEN POCT, MANUAL READ: ICD-10-PCS | Mod: QW,S$GLB,, | Performed by: FAMILY MEDICINE

## 2023-01-30 PROCEDURE — 87651 POCT STREP A MOLECULAR: ICD-10-PCS | Mod: QW,S$GLB,, | Performed by: FAMILY MEDICINE

## 2023-01-30 PROCEDURE — 3074F SYST BP LT 130 MM HG: CPT | Mod: CPTII,S$GLB,, | Performed by: FAMILY MEDICINE

## 2023-01-30 PROCEDURE — 99214 PR OFFICE/OUTPT VISIT, EST, LEVL IV, 30-39 MIN: ICD-10-PCS | Mod: S$GLB,,, | Performed by: FAMILY MEDICINE

## 2023-01-30 PROCEDURE — 1160F PR REVIEW ALL MEDS BY PRESCRIBER/CLIN PHARMACIST DOCUMENTED: ICD-10-PCS | Mod: CPTII,S$GLB,, | Performed by: FAMILY MEDICINE

## 2023-01-30 PROCEDURE — 87651 STREP A DNA AMP PROBE: CPT | Mod: QW,S$GLB,, | Performed by: FAMILY MEDICINE

## 2023-01-30 RX ORDER — AMOXICILLIN 875 MG/1
875 TABLET, FILM COATED ORAL EVERY 12 HOURS
Qty: 20 TABLET | Refills: 0 | Status: SHIPPED | OUTPATIENT
Start: 2023-01-30 | End: 2023-02-09

## 2023-01-30 NOTE — PROGRESS NOTES
"Subjective:       Patient ID: Veronica Ash is a 62 y.o. female.    Vitals:  height is 5' 4" (1.626 m) and weight is 60.8 kg (134 lb). Her tympanic temperature is 98.8 °F (37.1 °C). Her blood pressure is 95/67 and her pulse is 71. Her respiration is 20 and oxygen saturation is 95%.     Chief Complaint: Sore Throat (Body aches, coughing, headache)    This is a 62 y.o. female who presents today with a chief complaint of sore throat, coughing, body aches, and headaches that started on Friday.      Sore Throat   This is a new problem. The current episode started in the past 7 days. The problem has been gradually worsening. The pain is worse on the left side. There has been no fever. The fever has been present for Less than 1 day. The pain is at a severity of 5/10. The pain is mild. Associated symptoms include coughing, ear pain, headaches and trouble swallowing. Pertinent negatives include no hoarse voice. She has tried gargles for the symptoms. The treatment provided no relief.     HENT:  Positive for ear pain, sore throat and trouble swallowing.    Respiratory:  Positive for cough.    Neurological:  Positive for headaches.     Objective:      Physical Exam   Constitutional: She does not appear ill. No distress. normal  HENT:   Head: Normocephalic and atraumatic.   Nose: Congestion present.   Mouth/Throat: Mucous membranes are moist. Posterior oropharyngeal erythema present.   Eyes: Pupils are equal, round, and reactive to light. Extraocular movement intact   Neck: Neck supple.   Cardiovascular: Normal rate, regular rhythm, normal heart sounds and normal pulses.   Pulmonary/Chest: Effort normal and breath sounds normal.   Abdominal: Normal appearance and bowel sounds are normal. Soft.   Lymphadenopathy:     She has cervical adenopathy (tender).   Neurological: She is alert.   Nursing note and vitals reviewed.      Results for orders placed or performed in visit on 01/30/23   SARS Coronavirus 2 Antigen, POCT " Manual Read   Result Value Ref Range    SARS Coronavirus 2 Antigen Negative Negative     Acceptable Yes    POCT Strep A, Molecular   Result Value Ref Range    Molecular Strep A, POC Positive (A) Negative     Acceptable Yes       Assessment:       1. Strep pharyngitis          Plan:         Strep pharyngitis  -     SARS Coronavirus 2 Antigen, POCT Manual Read  -     POCT Strep A, Molecular  -     amoxicillin (AMOXIL) 875 MG tablet; Take 1 tablet (875 mg total) by mouth every 12 (twelve) hours. for 10 days  Dispense: 20 tablet; Refill: 0    OTC analgesia recommended.

## 2023-03-08 ENCOUNTER — PATIENT MESSAGE (OUTPATIENT)
Dept: GASTROENTEROLOGY | Facility: CLINIC | Age: 63
End: 2023-03-08
Payer: COMMERCIAL

## 2023-03-08 ENCOUNTER — TELEPHONE (OUTPATIENT)
Dept: GASTROENTEROLOGY | Facility: CLINIC | Age: 63
End: 2023-03-08
Payer: COMMERCIAL

## 2023-03-08 NOTE — TELEPHONE ENCOUNTER
Spoke with pt and got her rescheduled colonoscopy with Dr. Powers 4/27/23. Went over instructions and sent via portal. Verbalized understanding

## 2023-04-25 ENCOUNTER — TELEPHONE (OUTPATIENT)
Dept: ENDOSCOPY | Facility: HOSPITAL | Age: 63
End: 2023-04-25
Payer: COMMERCIAL

## 2023-04-25 NOTE — TELEPHONE ENCOUNTER
Spoke with patient about arrival time @ 0745.   Colon    Prep instructions reviewed: the day before the procedure, follow a clear liquid diet all day, then start the first 1/2 of prep at 5pm and take 2nd 1/2 of prep @ 0245.  Pt must be completely NPO when prep completed @ 0445.              Medications: Do not take Insulin or oral diabetic medications the day of the procedure.  Take as prescribed: heart, seizure and blood pressure medication in the morning with a sip of water (less than an ounce).  Take any breathing medications and bring inhalers to hospital with you Leave all valuables and jewelry at home.     Wear comfortable clothes to procedure to change into hospital gown You cannot drive for 24 hours after your procedure because you will receive sedation for your procedure to make you comfortable.  A ride must be provided at discharge.

## 2023-04-27 ENCOUNTER — ANESTHESIA (OUTPATIENT)
Dept: ENDOSCOPY | Facility: HOSPITAL | Age: 63
End: 2023-04-27
Payer: COMMERCIAL

## 2023-04-27 ENCOUNTER — HOSPITAL ENCOUNTER (OUTPATIENT)
Facility: HOSPITAL | Age: 63
Discharge: HOME | End: 2023-04-27
Attending: INTERNAL MEDICINE | Admitting: INTERNAL MEDICINE
Payer: COMMERCIAL

## 2023-04-27 ENCOUNTER — ANESTHESIA EVENT (OUTPATIENT)
Dept: ENDOSCOPY | Facility: HOSPITAL | Age: 63
End: 2023-04-27

## 2023-04-27 VITALS
WEIGHT: 125 LBS | HEART RATE: 49 BPM | OXYGEN SATURATION: 100 % | DIASTOLIC BLOOD PRESSURE: 48 MMHG | SYSTOLIC BLOOD PRESSURE: 102 MMHG | TEMPERATURE: 98 F | BODY MASS INDEX: 21.34 KG/M2 | HEIGHT: 64 IN | RESPIRATION RATE: 19 BRPM

## 2023-04-27 DIAGNOSIS — R19.7 DIARRHEA: ICD-10-CM

## 2023-04-27 PROCEDURE — 45380 PR COLONOSCOPY,BIOPSY: ICD-10-PCS | Mod: ,,, | Performed by: INTERNAL MEDICINE

## 2023-04-27 PROCEDURE — D9220A PRA ANESTHESIA: Mod: CRNA,,, | Performed by: NURSE ANESTHETIST, CERTIFIED REGISTERED

## 2023-04-27 PROCEDURE — 88305 TISSUE EXAM BY PATHOLOGIST: CPT | Performed by: STUDENT IN AN ORGANIZED HEALTH CARE EDUCATION/TRAINING PROGRAM

## 2023-04-27 PROCEDURE — 37000009 HC ANESTHESIA EA ADD 15 MINS: Performed by: INTERNAL MEDICINE

## 2023-04-27 PROCEDURE — D9220A PRA ANESTHESIA: ICD-10-PCS | Mod: CRNA,,, | Performed by: NURSE ANESTHETIST, CERTIFIED REGISTERED

## 2023-04-27 PROCEDURE — 27201012 HC FORCEPS, HOT/COLD, DISP: Performed by: INTERNAL MEDICINE

## 2023-04-27 PROCEDURE — 37000008 HC ANESTHESIA 1ST 15 MINUTES: Performed by: INTERNAL MEDICINE

## 2023-04-27 PROCEDURE — 88305 TISSUE EXAM BY PATHOLOGIST: CPT | Mod: 26,,, | Performed by: STUDENT IN AN ORGANIZED HEALTH CARE EDUCATION/TRAINING PROGRAM

## 2023-04-27 PROCEDURE — D9220A PRA ANESTHESIA: ICD-10-PCS | Mod: ANES,,, | Performed by: ANESTHESIOLOGY

## 2023-04-27 PROCEDURE — 25000003 PHARM REV CODE 250: Performed by: NURSE ANESTHETIST, CERTIFIED REGISTERED

## 2023-04-27 PROCEDURE — 63600175 PHARM REV CODE 636 W HCPCS: Performed by: NURSE ANESTHETIST, CERTIFIED REGISTERED

## 2023-04-27 PROCEDURE — 88305 TISSUE EXAM BY PATHOLOGIST: ICD-10-PCS | Mod: 26,,, | Performed by: STUDENT IN AN ORGANIZED HEALTH CARE EDUCATION/TRAINING PROGRAM

## 2023-04-27 PROCEDURE — D9220A PRA ANESTHESIA: Mod: ANES,,, | Performed by: ANESTHESIOLOGY

## 2023-04-27 PROCEDURE — 25000003 PHARM REV CODE 250: Performed by: INTERNAL MEDICINE

## 2023-04-27 PROCEDURE — 45380 COLONOSCOPY AND BIOPSY: CPT | Mod: ,,, | Performed by: INTERNAL MEDICINE

## 2023-04-27 PROCEDURE — 45380 COLONOSCOPY AND BIOPSY: CPT | Performed by: INTERNAL MEDICINE

## 2023-04-27 RX ORDER — PROPOFOL 10 MG/ML
VIAL (ML) INTRAVENOUS
Status: DISCONTINUED | OUTPATIENT
Start: 2023-04-27 | End: 2023-04-27

## 2023-04-27 RX ORDER — SODIUM CHLORIDE 0.9 % (FLUSH) 0.9 %
3 SYRINGE (ML) INJECTION
Status: DISCONTINUED | OUTPATIENT
Start: 2023-04-27 | End: 2023-04-27 | Stop reason: HOSPADM

## 2023-04-27 RX ORDER — PROPOFOL 10 MG/ML
VIAL (ML) INTRAVENOUS CONTINUOUS PRN
Status: DISCONTINUED | OUTPATIENT
Start: 2023-04-27 | End: 2023-04-27

## 2023-04-27 RX ORDER — DEXTROMETHORPHAN/PSEUDOEPHED 2.5-7.5/.8
DROPS ORAL
Status: COMPLETED | OUTPATIENT
Start: 2023-04-27 | End: 2023-04-27

## 2023-04-27 RX ORDER — LIDOCAINE HYDROCHLORIDE 20 MG/ML
INJECTION INTRAVENOUS
Status: DISCONTINUED | OUTPATIENT
Start: 2023-04-27 | End: 2023-04-27

## 2023-04-27 RX ORDER — SODIUM CHLORIDE 9 MG/ML
INJECTION, SOLUTION INTRAVENOUS CONTINUOUS
Status: DISCONTINUED | OUTPATIENT
Start: 2023-04-27 | End: 2023-04-27 | Stop reason: HOSPADM

## 2023-04-27 RX ADMIN — PROPOFOL 20 MG: 10 INJECTION, EMULSION INTRAVENOUS at 08:04

## 2023-04-27 RX ADMIN — PROPOFOL 150 MCG/KG/MIN: 10 INJECTION, EMULSION INTRAVENOUS at 08:04

## 2023-04-27 RX ADMIN — PROPOFOL 50 MG: 10 INJECTION, EMULSION INTRAVENOUS at 08:04

## 2023-04-27 RX ADMIN — LIDOCAINE HYDROCHLORIDE 50 MG: 20 INJECTION, SOLUTION INTRAVENOUS at 08:04

## 2023-04-27 RX ADMIN — SODIUM CHLORIDE: 0.9 INJECTION, SOLUTION INTRAVENOUS at 08:04

## 2023-04-27 NOTE — [employer] PATIENT INSTRUCTIONS
Discharge Summary/Instructions after an Endoscopic Procedure  Patient Name: Veronica Ash  Patient MRN: [id number]  Patient YOB: 1960 Thursday, April 27, 2023  Galo Powers MD  Dear patient,  As a result of recent federal legislation (The Federal Cures Act), you may   receive lab or pathology results from your procedure in your [facility]   account before your physician is able to contact you. Your physician or   their representative will relay the results to you with their   recommendations at their soonest availability.  Thank you,  Your health is very important to us during the Covid Crisis. Following your   procedure today, you will receive a daily text for 2 weeks asking about   signs or symptoms of Covid 19.  Please respond to this text when you   receive it so we can follow up and keep you as safe as possible.   RESTRICTIONS:  During your procedure today, you received medications for sedation.  These   medications may affect your judgment, balance and coordination.  Therefore,   for 24 hours, you have the following restrictions:   - DO NOT drive a car, operate machinery, make legal/financial decisions,   sign important papers or drink alcohol.    ACTIVITY:  Today: no heavy lifting, straining or running due to procedural   sedation/anesthesia.  The following day: return to full activity including work.  DIET:  Eat and drink normally unless instructed otherwise.     TREATMENT FOR COMMON SIDE EFFECTS:  - Mild abdominal pain, nausea, belching, bloating or excessive gas:  rest,   eat lightly and use a heating pad.  - Sore Throat: treat with throat lozenges and/or gargle with warm salt   water.  - Because air was used during the procedure, expelling large amounts of air   from your rectum or belching is normal.  - If a bowel prep was taken, you may not have a bowel movement for 1-3 days.    This is normal.  SYMPTOMS TO WATCH FOR AND REPORT TO YOUR PHYSICIAN:  1. Abdominal pain or  bloating, other than gas cramps.  2. Chest pain.  3. Back pain.  4. Signs of infection such as: chills or fever occurring within 24 hours   after the procedure.  5. Rectal bleeding, which would show as bright red, maroon, or black stools.   (A tablespoon of blood from the rectum is not serious, especially if   hemorrhoids are present.)  6. Vomiting.  7. Weakness or dizziness.  GO DIRECTLY TO THE NEAREST EMERGENCY ROOM IF YOU HAVE ANY OF THE FOLLOWING:      Difficulty breathing              Chills and/or fever over 101 F   Persistent vomiting and/or vomiting blood   Severe abdominal pain   Severe chest pain   Black, tarry stools   Bleeding- more than one tablespoon   Any other symptom or condition that you feel may need urgent attention  Your doctor recommends these additional instructions:  If any biopsies were taken, your doctors clinic will contact you in 1 to 2   weeks with any results.  - Discharge patient to home.   - Resume previous diet.   - Continue present medications.   - Await pathology results.   - Repeat colonoscopy in 5 years for surveillance.   - Patient has a contact number available for emergencies.  The signs and   symptoms of potential delayed complications were discussed with the   patient.  Return to normal activities tomorrow.  Written discharge   instructions were provided to the patient.  For questions, problems or results please call your physician - Galo Powers MD.  EMERGENCY PHONE NUMBER: [phone],  LAB RESULTS: [phone]  IF A COMPLICATION OR EMERGENCY SITUATION ARISES AND YOU ARE UNABLE TO REACH   YOUR PHYSICIAN - GO DIRECTLY TO THE EMERGENCY ROOM.  Galo Powers MD  4/27/2023 9:15:17 AM  This report has been verified and signed electronically.  Dear patient,  As a result of recent federal legislation (The Federal Cures Act), you may   receive lab or pathology results from your procedure in your [facility]   account before your physician is able to  contact you. Your physician or   their representative will relay the results to you with their   recommendations at their soonest availability.  Thank you,  [employer]

## 2023-04-27 NOTE — TRANSFER OF CARE
"Anesthesia Transfer of Care Note    Patient: Veronica Ash    Procedure(s) Performed: Procedure(s) (LRB):  COLONOSCOPY Suprep (N/A)    Patient location: GI    Anesthesia Type: general    Transport from OR: Transported from OR on room air with adequate spontaneous ventilation    Post pain: adequate analgesia    Post assessment: no apparent anesthetic complications    Post vital signs: stable    Level of consciousness: awake    Nausea/Vomiting: no nausea/vomiting    Complications: none    Transfer of care protocol was followed      Last vitals:   Visit Vitals  BP (!) 117/56 (BP Location: Left arm, Patient Position: Lying)   Pulse (!) 56   Temp 37 °C (98.6 °F) (Temporal)   Resp 18   Ht 5' 4" (1.626 m)   Wt 56.7 kg (125 lb)   SpO2 100%   Breastfeeding No   BMI 21.46 kg/m²     "

## 2023-04-27 NOTE — ANESTHESIA PREPROCEDURE EVALUATION
04/27/2023  Veronica Ash is a 62 y.o., female.      Pre-op Assessment    I have reviewed the Patient Summary Reports.     I have reviewed the Nursing Notes. I have reviewed the NPO Status.   I have reviewed the Medications.     Review of Systems  Hepatic/GI:   GERD        Physical Exam    Airway:  Mallampati: II   Mouth Opening: Normal  Neck ROM: Normal ROM        Anesthesia Plan  Type of Anesthesia, risks & benefits discussed:    Anesthesia Type: Gen Natural Airway  Intra-op Monitoring Plan: Standard ASA Monitors  Post Op Pain Control Plan: multimodal analgesia  Induction:  IV  Informed Consent: Informed consent signed with the Patient and all parties understand the risks and agree with anesthesia plan.  All questions answered.   ASA Score: 2  Day of Surgery Review of History & Physical: H&P Update referred to the surgeon/provider.    Ready For Surgery From Anesthesia Perspective.     .

## 2023-04-27 NOTE — H&P
Short Stay Endoscopy History and Physical    PCP - Yohannes Arevalo MD    Procedure - Colonoscopy  ASA - III  Mallampati - per anesthesia  History of Anesthesia problems - no  Family history Anesthesia problems - no     HPI:  This is a 62 y.o. female here for evaluation of : Colon cancer screening. Diarrhea    ROS:  Constitutional: No fevers, chills, No weight loss  ENT: No allergies  CV: No chest pain  Pulm: No shortness of breath  GI: see HPI  Derm: No rash    Medical History:  has a past medical history of Abnormal Pap smear of cervix, Depression, GERD (gastroesophageal reflux disease), Melanoma, Thyroid disease, and Vertigo.    Surgical History:  has a past surgical history that includes Cholecystectomy; Excision of melanoma (Left); and Gynecologic cryosurgery (1980).    Family History: family history includes Brain cancer in her mother; Lung cancer in her father; Ovarian cancer (age of onset: 67) in her sister; Stomach cancer in her mother.. Otherwise no colon cancer, inflammatory bowel disease, or GI malignancies.    Social History:  reports that she has never smoked. She has never been exposed to tobacco smoke. She has never used smokeless tobacco. She reports current alcohol use. She reports that she does not use drugs.    Review of patient's allergies indicates:   Allergen Reactions    Aspirin      Other reaction(s): whelps & swelling    Sulfa (sulfonamide antibiotics) Rash       Medications:   Medications Prior to Admission   Medication Sig Dispense Refill Last Dose    citalopram (CELEXA) 40 MG tablet Take 1 tablet (40 mg total) by mouth once daily. 90 tablet 1 Past Week    levothyroxine (SYNTHROID) 112 MCG tablet Take 1 tablet (112 mcg total) by mouth before breakfast. 30 tablet 3 Past Week    meclizine (ANTIVERT) 25 mg tablet Take 1 tablet (25 mg total) by mouth 3 (three) times daily as needed for Dizziness or Nausea. 60 tablet 1 Past Month    meloxicam (MOBIC) 15 MG tablet Take 15 mg by mouth once daily.    Past Week    prasterone, dhea, (INTRAROSA) 6.5 mg Inst Place 6.5 mg vaginally every evening. 30 each 6 Past Week    progesterone (PROMETRIUM) 200 MG capsule Take 1 capsule (200 mg total) by mouth nightly. 30 capsule 11 Past Week    albuterol (PROVENTIL/VENTOLIN HFA) 90 mcg/actuation inhaler Inhale 1-2 puffs into the lungs every 6 (six) hours as needed for Wheezing. 18 g 0 More than a month    clobetasol 0.05% (TEMOVATE) 0.05 % Oint Apply topically 2 (two) times daily. 45 g 1          Objective Findings:    Vital Signs: see nursing notes  Physical Exam:  General Appearance: Well appearing in no acute distress  Eyes:    No scleral icterus  ENT: Neck supple  Lungs: CTA anteriorly  Heart:  S1, S2 normal, no murmurs heard  Abdomen: Soft, non tender, non distended with positive bowel sounds. No hepatosplenomegaly, ascites, or mass  Extremities: no edema  Skin: No rash      Labs:  Lab Results   Component Value Date    WBC 6.52 10/18/2022    HGB 12.9 10/18/2022    HCT 38.5 10/18/2022     10/18/2022    CHOL 247 (H) 10/18/2022    TRIG 58 10/18/2022    HDL 94 (H) 10/18/2022    ALT 16 10/18/2022    AST 28 10/18/2022     10/18/2022    K 4.6 10/18/2022     10/18/2022    CREATININE 0.65 10/18/2022    BUN 17 10/18/2022    CO2 30 (H) 10/18/2022    TSH 2.468 01/10/2023       I have explained the risks and benefits of endoscopy procedures to the patient including but not limited to bleeding, perforation, infection, and death.    Galo Powers MD

## 2023-04-27 NOTE — ANESTHESIA POSTPROCEDURE EVALUATION
Anesthesia Post Evaluation    Patient: Veronica Ash    Procedure(s) Performed: Procedure(s) (LRB):  COLONOSCOPY Suprep (N/A)    Final Anesthesia Type: general      Patient location during evaluation: PACU  Patient participation: Yes- Able to Participate  Level of consciousness: awake and alert  Post-procedure vital signs: reviewed and stable  Pain management: adequate  Airway patency: patent    PONV status at discharge: No PONV  Anesthetic complications: no      Cardiovascular status: stable  Respiratory status: spontaneous ventilation  Hydration status: euvolemic  Follow-up not needed.          Vitals Value Taken Time   /48 04/27/23 0935   Temp 36.4 °C (97.5 °F) 04/27/23 0922   Pulse 49 04/27/23 0935   Resp 19 04/27/23 0935   SpO2 100 % 04/27/23 0935         Event Time   Out of Recovery 09:51:36         Pain/Ame Score: Ame Score: 10 (4/27/2023  9:23 AM)

## 2023-05-03 LAB
FINAL PATHOLOGIC DIAGNOSIS: NORMAL
Lab: NORMAL

## 2023-06-25 DIAGNOSIS — F41.9 ANXIETY AND DEPRESSION: ICD-10-CM

## 2023-06-25 DIAGNOSIS — F32.A ANXIETY AND DEPRESSION: ICD-10-CM

## 2023-06-25 DIAGNOSIS — E03.9 HYPOTHYROIDISM, UNSPECIFIED TYPE: ICD-10-CM

## 2023-06-26 RX ORDER — CITALOPRAM 40 MG/1
40 TABLET, FILM COATED ORAL DAILY
Qty: 90 TABLET | Refills: 1 | Status: SHIPPED | OUTPATIENT
Start: 2023-06-26 | End: 2023-08-30

## 2023-06-26 RX ORDER — LEVOTHYROXINE SODIUM 112 UG/1
112 TABLET ORAL
Qty: 30 TABLET | Refills: 3 | Status: SHIPPED | OUTPATIENT
Start: 2023-06-26 | End: 2023-11-07

## 2023-06-27 NOTE — TELEPHONE ENCOUNTER
----- Message from Jennifer Enriquez sent at 6/27/2023 12:22 PM CDT -----  Regarding: refill  Contact: [phone]  Type:  RX Refill Request    Who Called:  pt  Refill or New Rx: refill  RX Name and Strength: meloxicam (MOBIC) 15 MG tablet  How is the patient currently taking it? (ex. 1XDay): Take 15 mg by mouth once daily. - Oral  Is this a 30 day or 90 day RX:    Preferred Pharmacy with phone number: [facility] [address] [facility]  Local or Mail Order: local  Ordering Provider: John Wilson Edmund  Would the patient rather a call back or a response via [facility]?  call  Best Call Back Number: [phone]  Additional Information:

## 2023-06-30 RX ORDER — MELOXICAM 15 MG/1
15 TABLET ORAL DAILY
OUTPATIENT
Start: 2023-06-30

## 2023-07-11 ENCOUNTER — PATIENT MESSAGE (OUTPATIENT)
Dept: FAMILY MEDICINE | Facility: CLINIC | Age: 63
End: 2023-07-11

## 2023-07-11 DIAGNOSIS — R42 VERTIGO: ICD-10-CM

## 2023-07-11 RX ORDER — MECLIZINE HYDROCHLORIDE 25 MG/1
25 TABLET ORAL 3 TIMES DAILY PRN
Qty: 60 TABLET | Refills: 1 | Status: SHIPPED | OUTPATIENT
Start: 2023-07-11

## 2023-08-29 DIAGNOSIS — F41.9 ANXIETY AND DEPRESSION: ICD-10-CM

## 2023-08-29 DIAGNOSIS — F32.A ANXIETY AND DEPRESSION: ICD-10-CM

## 2023-08-30 RX ORDER — MELOXICAM 15 MG/1
15 TABLET ORAL DAILY
Qty: 90 TABLET | Refills: 0 | Status: SHIPPED | OUTPATIENT
Start: 2023-08-30 | End: 2023-11-14

## 2023-08-30 RX ORDER — CITALOPRAM 40 MG/1
40 TABLET, FILM COATED ORAL DAILY
Qty: 90 TABLET | Refills: 0 | Status: SHIPPED | OUTPATIENT
Start: 2023-08-30 | End: 2023-11-14

## 2023-08-30 RX ORDER — PROGESTERONE 200 MG/1
200 CAPSULE ORAL NIGHTLY
Qty: 90 CAPSULE | Refills: 0 | Status: SHIPPED | OUTPATIENT
Start: 2023-08-30 | End: 2023-11-20

## 2023-08-30 NOTE — TELEPHONE ENCOUNTER
----- Message from Koki Ramos sent at 8/30/2023  1:10 PM CDT -----  Type:  Needs Medical Advice    Who Called: pt  Symptoms (please be specific): pt needs to get in this week been having sever lower back pain that won't go away please call to schedule   Would the patient rather a call back or a response via [facility]? call  Best Call Back Number: [phone]   Additional Information:

## 2023-09-01 ENCOUNTER — OFFICE VISIT (OUTPATIENT)
Dept: FAMILY MEDICINE | Facility: CLINIC | Age: 63
End: 2023-09-01
Payer: COMMERCIAL

## 2023-09-01 VITALS
OXYGEN SATURATION: 98 % | RESPIRATION RATE: 18 BRPM | WEIGHT: 129.44 LBS | BODY MASS INDEX: 22.1 KG/M2 | HEART RATE: 60 BPM | HEIGHT: 64 IN

## 2023-09-01 DIAGNOSIS — M54.50 ACUTE LEFT-SIDED LOW BACK PAIN, UNSPECIFIED WHETHER SCIATICA PRESENT: ICD-10-CM

## 2023-09-01 DIAGNOSIS — M53.3 PAIN OF LEFT SACROILIAC JOINT: Primary | ICD-10-CM

## 2023-09-01 PROCEDURE — 1160F RVW MEDS BY RX/DR IN RCRD: CPT | Mod: CPTII,S$GLB,, | Performed by: FAMILY MEDICINE

## 2023-09-01 PROCEDURE — 1159F PR MEDICATION LIST DOCUMENTED IN MEDICAL RECORD: ICD-10-PCS | Mod: CPTII,S$GLB,, | Performed by: FAMILY MEDICINE

## 2023-09-01 PROCEDURE — 1159F MED LIST DOCD IN RCRD: CPT | Mod: CPTII,S$GLB,, | Performed by: FAMILY MEDICINE

## 2023-09-01 PROCEDURE — 96372 THER/PROPH/DIAG INJ SC/IM: CPT | Mod: S$GLB,,, | Performed by: FAMILY MEDICINE

## 2023-09-01 PROCEDURE — 99999 PR PBB SHADOW E&M-EST. PATIENT-LVL III: CPT | Mod: PBBFAC,,, | Performed by: FAMILY MEDICINE

## 2023-09-01 PROCEDURE — 96372 PR INJECTION,THERAP/PROPH/DIAG2ST, IM OR SUBCUT: ICD-10-PCS | Mod: S$GLB,,, | Performed by: FAMILY MEDICINE

## 2023-09-01 PROCEDURE — 1160F PR REVIEW ALL MEDS BY PRESCRIBER/CLIN PHARMACIST DOCUMENTED: ICD-10-PCS | Mod: CPTII,S$GLB,, | Performed by: FAMILY MEDICINE

## 2023-09-01 PROCEDURE — 99999 PR PBB SHADOW E&M-EST. PATIENT-LVL III: ICD-10-PCS | Mod: PBBFAC,,, | Performed by: FAMILY MEDICINE

## 2023-09-01 PROCEDURE — 3008F BODY MASS INDEX DOCD: CPT | Mod: CPTII,S$GLB,, | Performed by: FAMILY MEDICINE

## 2023-09-01 PROCEDURE — 3008F PR BODY MASS INDEX (BMI) DOCUMENTED: ICD-10-PCS | Mod: CPTII,S$GLB,, | Performed by: FAMILY MEDICINE

## 2023-09-01 PROCEDURE — 99214 OFFICE O/P EST MOD 30 MIN: CPT | Mod: 25,S$GLB,, | Performed by: FAMILY MEDICINE

## 2023-09-01 PROCEDURE — 99214 PR OFFICE/OUTPT VISIT, EST, LEVL IV, 30-39 MIN: ICD-10-PCS | Mod: 25,S$GLB,, | Performed by: FAMILY MEDICINE

## 2023-09-01 RX ORDER — TRIAMCINOLONE ACETONIDE 40 MG/ML
80 INJECTION, SUSPENSION INTRA-ARTICULAR; INTRAMUSCULAR ONCE
Status: COMPLETED | OUTPATIENT
Start: 2023-09-01 | End: 2023-09-01

## 2023-09-01 RX ORDER — METHYLPREDNISOLONE ACETATE 80 MG/ML
80 INJECTION, SUSPENSION INTRA-ARTICULAR; INTRALESIONAL; INTRAMUSCULAR; SOFT TISSUE
Status: DISCONTINUED | OUTPATIENT
Start: 2023-09-01 | End: 2023-09-01

## 2023-09-01 RX ADMIN — TRIAMCINOLONE ACETONIDE 80 MG: 40 INJECTION, SUSPENSION INTRA-ARTICULAR; INTRAMUSCULAR at 02:09

## 2023-09-01 NOTE — PROGRESS NOTES
Office Visit    Patient Name: Veronica Ash    : 1960  MRN: [id number]    Subjective:  Veronica is a 62 y.o. female who presents today for:    Back Pain    62-year-old patient of Dr. Arevalo- established 10/10/2022 with unremarkable labs 10/18/2022-- has hypothyroidism with most recent TSH normal  Lumbar spine x-ray 2022: Multilevel moderate degenerative disc disease.    About 3 weeks ago she started experiencing a tugging and pulling in the left low back.  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     The pain persisted and Tuesday 3 days ago she had a really bad day with some radiating sharp pain into the base of the left buttock and back of left upper thigh.     Has tried Heating pad and Ibuprofen 800 and didn't get much relief.  Has not had any leg weakness or pain extending beyond the base of the buttock.  Some left-sided back pain of the low to mid back.  But when asked to identify her pain she takes 2 fingers and points to the left SI joint.    PAST MEDICAL HISTORY, SURGICAL/SOCIAL/FAMILY HISTORY REVIEWED AS PER CHART, WITH PERTINENT FINDINGS INCLUDED IN HISTORY SECTION OF NOTE.     Current Medications    Medication List with Changes/Refills   Current Medications    ALBUTEROL (PROVENTIL/VENTOLIN HFA) 90 MCG/ACTUATION INHALER    Inhale 1-2 puffs into the lungs every 6 (six) hours as needed for Wheezing.    CITALOPRAM (CELEXA) 40 MG TABLET    Take 1 tablet (40 mg total) by mouth once daily.    CLOBETASOL 0.05% (TEMOVATE) 0.05 % OINT    Apply topically 2 (two) times daily.    LEVOTHYROXINE (SYNTHROID) 112 MCG TABLET    Take 1 tablet (112 mcg total) by mouth before breakfast.    MECLIZINE (ANTIVERT) 25 MG TABLET    Take 1 tablet (25 mg total) by mouth 3 (three) times daily as needed for Dizziness or Nausea.    MELOXICAM (MOBIC) 15 MG TABLET    Take 1 tablet (15 mg total) by  "mouth once daily.    PRASTERONE, DHEA, (INTRAROSA) 6.5 MG INST    Place 6.5 mg vaginally every evening.    PROGESTERONE (PROMETRIUM) 200 MG CAPSULE    Take 1 capsule (200 mg total) by mouth every evening.       Allergies   Review of patient's allergies indicates:   Allergen Reactions    Aspirin      Other reaction(s): whelps & swelling    Sulfa (sulfonamide antibiotics) Rash         Review of Systems (Pertinent positives)  Review of Systems   Musculoskeletal:  Positive for back pain.   Neurological:  Negative for weakness.       Pulse 60   Resp 18   Ht 5' 4" (1.626 m)   Wt 58.7 kg (129 lb 6.6 oz)   SpO2 98%   BMI 22.21 kg/m²     Physical Exam  Vitals reviewed.   Constitutional:       General: She is not in acute distress.     Appearance: Normal appearance. She is well-developed.   HENT:      Head: Normocephalic and atraumatic.   Eyes:      Conjunctiva/sclera: Conjunctivae normal.   Pulmonary:      Effort: Pulmonary effort is normal.   Musculoskeletal:      Lumbar back: Spasms (Left lumbar to lower thoracic paraspinal) and tenderness present. Negative right straight leg raise test and negative left straight leg raise test.        Back:       Right lower leg: No edema.      Left lower leg: No edema.   Skin:     General: Skin is warm and dry.   Neurological:      General: No focal deficit present.      Mental Status: She is alert and oriented to person, place, and time.   Psychiatric:         Mood and Affect: Mood normal.         Behavior: Behavior normal.           Assessment/Plan:  Veronica Ash is a 62 y.o. female who presents today for :        ICD-10-CM ICD-9-CM    1. Pain of left sacroiliac joint  M53.3 724.6 methylPREDNISolone acetate injection 80 mg      Ambulatory referral/consult to Physical/Occupational Therapy      2. Acute left-sided low back pain, unspecified whether sciatica present  M54.50 724.2 methylPREDNISolone acetate injection 80 mg      Ambulatory referral/consult to " Physical/Occupational Therapy        62-year-old female with localized pain and tenderness of the left sacroiliac joint.  Has some postural imbalance likely contributing/associated with some left-sided paraspinal muscle tension.      Steroid shot with Kenalog 80 to reduce inflammation, hopefully reduce pain.      Demonstrated some pelvic and core stabilization exercises such as a supine bridge with ball squeeze to help build glute, deep core strength and pelvic stability.    Also have placed a referral for formal physical therapy for evaluation and management.      Discussed the importance of maintaining a neutral spine when engaging in house cleaning and yard work type activities.      Follow-up to discuss imaging/pain management referral should symptoms not respond adequately to steroid injection and PT.        There are no Patient Instructions on file for this visit.      Follow up for return as needed for new concerns.

## 2023-09-11 ENCOUNTER — PATIENT MESSAGE (OUTPATIENT)
Dept: GASTROENTEROLOGY | Facility: CLINIC | Age: 63
End: 2023-09-11
Payer: MEDICARE

## 2023-09-11 ENCOUNTER — PATIENT MESSAGE (OUTPATIENT)
Dept: FAMILY MEDICINE | Facility: CLINIC | Age: 63
End: 2023-09-11
Payer: MEDICARE

## 2023-09-13 ENCOUNTER — CLINICAL SUPPORT (OUTPATIENT)
Dept: REHABILITATION | Facility: HOSPITAL | Age: 63
End: 2023-09-13
Payer: MEDICARE

## 2023-09-13 DIAGNOSIS — M54.50 ACUTE LEFT-SIDED LOW BACK PAIN, UNSPECIFIED WHETHER SCIATICA PRESENT: ICD-10-CM

## 2023-09-13 DIAGNOSIS — M53.3 PAIN OF LEFT SACROILIAC JOINT: ICD-10-CM

## 2023-09-13 DIAGNOSIS — M53.86 DECREASED RANGE OF MOTION OF INTERVERTEBRAL DISCS OF LUMBAR SPINE: ICD-10-CM

## 2023-09-13 PROCEDURE — 97140 MANUAL THERAPY 1/> REGIONS: CPT | Mod: HCNC,PN | Performed by: PHYSICAL THERAPIST

## 2023-09-13 PROCEDURE — 97161 PT EVAL LOW COMPLEX 20 MIN: CPT | Mod: HCNC,PN | Performed by: PHYSICAL THERAPIST

## 2023-09-13 NOTE — PROGRESS NOTES
"[facility]  Physical Therapy Initial Evaluation    Date: 9/13/2023   Name: Veronica Ash  Clinic Number: [id number]    Therapy Diagnosis: No diagnosis found.  Physician: Jazmín Malin MD    Physician Orders: PT Eval and Treat   Medical Diagnosis from Referral:   M53.3 (ICD-10-CM) - Pain of left sacroiliac joint   M54.50 (ICD-10-CM) - Acute left-sided low back pain, unspecified whether sciatica present     Evaluation Date: 9/13/2023  Authorization Period Expiration: 8/31/24  Plan of Care Expiration: 10/13/23  Progress Note Due: 10/12/23  Visit # / Visits authorized: 1/ 1   FOTO: 1/ 5   PTA: 0/5    Precautions: Standard    Time In: 3:20 pm  Time Out: 4:00 pm  Total Appointment Time (timed & untimed codes): 40 minutes ([place], [place])    SUBJECTIVE   Date of onset: mid august    History of current condition - Veronica reports: about 1 month ago she started feeling a "tugging and pulling" in her low back. She does endorse a history of LBP. She had an x-ray in 2022 of her lumbar spine. On 8/29/23, she had sharp, radiating pain into her left buttock and upper thigh. A heating pad and ibuprofen have not helped symptoms. On 9/1/23, she had a steroid shot with kenalog and was provided with some core stabilization exercises for home. Symptoms have improved since the injection, but are still present. She does admit to fear avoidance strategies to avoid more injury. She was referred to [facility] for PT.      Falls: none    Imaging, bone scan films: (2022): moderate DDD    Prior Therapy: none  Social History: stairs, no barriers  Occupation: cleans houses  Prior Level of Function: active, works, yardwork  Current Level of Function: can be slower with stairs mid day, able to complete everything, but slower, increased walking causes pain. Ok cleaning 1st house, but 2nd house causes more pain  Sleep: pillow under knees supine and between knees in side lying    Pain:  Current 3/10, worst 8/10, " best 0/10   Location: left back to buttock  Constant or intermittent: intermittent  Description: sharp, dull   Aggravating Factors: twisting, increased activity  Easing Factors: rest    Patients goals: clean the 2nd house without pain     Medical History:   Past Medical History:   Diagnosis Date    Abnormal Pap smear of cervix     Depression     GERD (gastroesophageal reflux disease)     Melanoma     Thyroid disease     Vertigo        Surgical History:   Veronica Ash  has a past surgical history that includes Cholecystectomy; Excision of melanoma (Left); Gynecologic cryosurgery (1980); and Colonoscopy (N/A, 4/27/2023).    Medications:   Veronica has a current medication list which includes the following prescription(s): albuterol, citalopram, clobetasol 0.05%, levothyroxine, meclizine, meloxicam, intrarosa, and progesterone.    Allergies:   Review of patient's allergies indicates:   Allergen Reactions    Aspirin      Other reaction(s): whelps & swelling    Sulfa (sulfonamide antibiotics) Rash          OBJECTIVE       Palpation: left L5 transverse process and PSIS    Posture:  Right anterior innominate    Functional Movements:  Gait: WNL  Sit <> stand: WNL and no UE  Squat: narrow base of support, but good form and full depth      AROM:   Degrees Pain/Dysfunction   Flexion 5 cm Repeated: NP   Extension 28 Mild central LBP    Right Rotation WNL NP   Left Rotation WNL NP   Right Side Bending 43 cm NP   Left Side Bending 43 cm NP     Hip external rotation PROM: right: wnl    left: wnl  Hip internal rotation PROM:  right: wnl    left: wnl    Strength:  RLE  LLE    Hip flexion: 5/5 Hip flexion: 5/5   Hip Abduction: 5/5 Hip abduction: 5/5   Hip extension 5/5 Hip extension 5/5   Hip ER 5/5 Hip ER 5/5   Hip IR 5/5 Hip IR 5/5   Knee flexion: 5/5 Knee flexion: 5/5   Knee extension: 5/5 Knee extension: 5/5   Ankle Dorsiflexion: 5/5 Ankle Dorsiflexion: 5/5   Ankle Plantarflexion: 5/5 Ankle Plantarflexion: 5/5  "    Special Tests:   Degrees Pain/Dysfunction   Hamstring 90/90 10 deg NP   Ely Test - NP   Obers Test NT NP   SLR Test  Sciatic n  Post tib (dorsiflexion + eversion)  Sural (dorsiflexion + inversion)  Common peroneal (hip internal rotation, plantarflexion & inversion)   - NP   Slump Test - NP   Flexion Preference - NP   Extension Preference - NP   Piriformis test - NP   FABERs - NP   FADIR - NP   Flick test - NP   SIJ Compression NT NP   Gaenslens NT NP   Sacral Thrust NT NP   Thigh Thrust NT NP   Sciatic nerve tension test - NP   Femoral nerve tension test - NP       Sensation: intact light touch         CMS Impairment/Limitation/Restriction for FOTO Back Survey    Therapist reviewed FOTO scores for Veronica Ash on 9/13/2023.   FOTO documents entered into [employer] - see Media section.    Current Limitation Score: 55%  Predicted Limitation Score: 38%       TREATMENT     Total Treatment time (time-based codes) separate from Evaluation: 13 minutes ([place])    Veronica received therapeutic exercises to develop strength, endurance, ROM, flexibility, posture, and core stabilization for 5 minutes including:  Home exercise program:  Open books: 5x5" each    Veronica received the following manual therapy techniques: Joint mobilizations, Manual traction, Myofacial release, and Soft tissue Mobilization were applied to the: back for 8 minutes:    MET for right anterior innominate   Grade IV-V Bilateral lumbar gapping/HVLAT    Veronica participated in neuromuscular re-education activities to improve: Balance, Coordination, Proprioception, and Posture for 0 minutes. The following activities were included:  Not today  Next:  Transverse abdominis bracing  Transverse abdominis bracing with ball squeeze  Transverse abdominis bracing with BKFO    therapeutic activities to improve functional performance for 0  minutes, including:  Not today  Next  Body mechanics    PATIENT EDUCATION AND HOME EXERCISES     Education provided:   - " anatomy  - importance of home exercise program compliance  - attendance policy    Written Home Exercises Provided: yes. Exercises were reviewed and Veronica was able to demonstrate them prior to the end of the session.  Veronica demonstrated good  understanding of the education provided. See EMR under Patient Instructions for exercises provided during therapy sessions.    ASSESSMENT   Veronica is a 63 y.o. female referred to outpatient Physical Therapy with a medical diagnosis of left SIJ pain. Pt presents with left PSIS and L5 pain. Good hip mobility and lower extremity strength present, but lumbar hypomobility. Left posterior innominate found today and corrected. Decreased symptoms with lumbar gapping. She lives an active lifestyle and cleans 2 houses per day. Symptoms are worse while cleaning 2nd house and afterwards. No radicular symptoms since injection. She reports improved symptoms and no pain upon completion of evaluation. She is appropriate for skilled PT to help her reach her goals.    Pt prognosis is Excellent.   Pt will benefit from skilled outpatient Physical Therapy to address the deficits stated above and in the chart below, provide pt/family education, and to maximize pt's level of independence.     Plan of care discussed with patient: Yes  Pt's spiritual, cultural and educational needs considered and pt agreeable to plan of care and goals as stated below:     Anticipated Barriers for therapy: none    Medical Necessity is demonstrated by the following  History  Co-morbidities and personal factors that may impact the plan of care Co-morbidities:   depression    Personal Factors:   lifestyle     moderate   Examination  Body Structures and Functions, activity limitations and participation restrictions that may impact the plan of care Body Regions:   back  lower extremities    Body Systems:    ROM  strength  transfers  transitions  motor control    Participation Restrictions:   availability    Activity  limitations:   Learning and applying knowledge  no deficits    General Tasks and Commands  no deficits    Communication  no deficits    Mobility  lifting and carrying objects  walking    Self care  no deficits    Domestic Life  shopping  cooking  doing house work (cleaning house, washing dishes, laundry)    Interactions/Relationships  no deficits    Life Areas  employment    Community and Social Life  community life  recreation and leisure         moderate     Clinical Presentation stable and uncomplicated low   Decision Making/ Complexity Score: low     Goals:  Short Term Goals: 2 weeks:  1. Patient will demonstrate no pelvic innominate.  2. Patient will demonstrate an understanding and compliance with home exercise program.  3. Patient will report worst pain less than 5/10 in low back for improved tolerance to ADL performance.    Long Term Goals: 4 weeks:  1. Patient will demonstrate proper body mechanics with ADLs like vacuuming and laundry.  2. Patient will demonstrate proper squatting body mechanics for picking up objects.  3. Patient will improve FOTO to < 39% to improve ADL performance.  4. Patient will report completing full work day without any not normal pains.      PLAN   Plan of care Certification: 9/13/2023 to 10/13/23.    Outpatient Physical Therapy 2 times weekly for 4 weeks to include the following interventions: Cervical/Lumbar Traction, Electrical Stimulation TENS, IFC, NMES, Gait Training, Manual Therapy, Moist Heat/ Ice, Neuromuscular Re-ed, Patient Education, Self Care, Therapeutic Activities, Therapeutic Exercise, and dry needling .     Gianfranco Smith, PT      I CERTIFY THE NEED FOR THESE SERVICES FURNISHED UNDER THIS PLAN OF TREATMENT AND WHILE UNDER MY CARE   Physician's comments:     Physician's Signature: ___________________________________________________

## 2023-09-18 NOTE — PROGRESS NOTES
"[facility]   Physical Therapy Treatment Note      Name: Veronica Ash  Clinic Number: [id number]    Therapy Diagnosis:   Encounter Diagnosis   Name Primary?    Decreased range of motion of intervertebral discs of lumbar spine Yes     Physician: Jazmín Malin MD    Visit Date: 9/19/2023    Physician Orders: PT Eval and Treat   Medical Diagnosis from Referral:   M53.3 (ICD-10-CM) - Pain of left sacroiliac joint   M54.50 (ICD-10-CM) - Acute left-sided low back pain, unspecified whether sciatica present      Evaluation Date: 9/13/2023  Authorization Period Expiration: 8/31/24  Plan of Care Expiration: 10/13/23  Progress Note Due: 10/12/23  Visit # / Visits authorized: 1/ 20 (+1)   FOTO: 2/ 5   PTA: 0/5     Precautions: Standard     Time In: 9:00 am  Time Out: 10:00 am  Total Appointment Time (timed & untimed codes): 40 1:1 minutes ([place], [place], [place])      Subjective     Patient reports: she had a couple days of relief, but was very busy doing yardwork and cleaning the house and symptoms returned.  She was compliant with home exercise program.  Response to previous treatment: a couple days of improvements  Functional change: ongoing    Pain: 0/10  Location: left back      Objective      Objective Measures updated at progress report unless specified.     Treatment     Veronica received the treatments listed below:      Veronica received therapeutic exercises to develop strength, endurance, ROM, flexibility, posture, and core stabilization for supervised including:    Open books: 5x5" each    Next:  Side lying hip abduction  Clamshells  Reverse clamshells  Bridges      Veronica received the following manual therapy techniques: Joint mobilizations, Manual traction, Myofacial release, and Soft tissue Mobilization were applied to the: back for 8 minutes:     MET for right anterior innominate - not needed today  Grade IV-V Bilateral lumbar gapping/HVLAT     Veronica participated in " "neuromuscular re-education activities to improve: Balance, Coordination, Proprioception, and Posture for 10 1:1 minutes. The following activities were included:    Transverse abdominis bracin minutes with 5" holds  Transverse abdominis bracing with ball squeeze: 2 minutes with 5" holds  Transverse abdominis bracing with BKFO: 2 minutes      therapeutic activities to improve functional performance for 22 1:1  minutes, including:    Education and demonstration on proper body mechanics for vacuuming, sweeping, laundry and lifting. Handout provided   Hip hinge: 15x  Squats: 2x10  Single leg RDL: 10x each    Patient Education and Home Exercises       Education provided:   - body mechanics with ADLs and lifting - handout provided    Written Home Exercises Provided: Patient instructed to cont prior HEP. Exercises were reviewed and Veronica was able to demonstrate them prior to the end of the session.  Veronica demonstrated good  understanding of the education provided. See Electronic Medical Record under Patient Instructions for exercises provided during therapy sessions    Assessment     Veronica is a 63 y.o. female referred to outpatient Physical Therapy with a medical diagnosis of left SIJ pain. Pt presents with left PSIS and L5 pain. No pelvic innominate present. Educated on proper body mechanics with pt admitting to performing most incorrectly at home. Able to demonstrate good mechanics in session today.    Veronica Is progressing well towards her goals.   Patient prognosis is Excellent.     Patient will continue to benefit from skilled outpatient physical therapy to address the deficits listed in the problem list box on initial evaluation, provide pt/family education and to maximize pt's level of independence in the home and community environment.     Patient's spiritual, cultural and educational needs considered and pt agreeable to plan of care and goals.     Anticipated barriers to physical therapy: " none    Goals:   Short Term Goals: 2 weeks:  1. Patient will demonstrate no pelvic innominate. MET  2. Patient will demonstrate an understanding and compliance with home exercise program. MET  3. Patient will report worst pain less than 5/10 in low back for improved tolerance to ADL performance. PROGRESSING; NOT MET     Long Term Goals: 4 weeks:  1. Patient will demonstrate proper body mechanics with ADLs like vacuuming and laundry. PROGRESSING; NOT MET  2. Patient will demonstrate proper squatting body mechanics for picking up objects. PROGRESSING; NOT MET  3. Patient will improve FOTO to < 39% to improve ADL performance. PROGRESSING; NOT MET  4. Patient will report completing full work day without any not normal pains. PROGRESSING; NOT MET    Plan     Cont with POC    Plan of care Certification: 9/13/2023 to 10/13/23.     Gianfranco Smith, PT

## 2023-09-19 ENCOUNTER — CLINICAL SUPPORT (OUTPATIENT)
Dept: REHABILITATION | Facility: HOSPITAL | Age: 63
End: 2023-09-19
Payer: MEDICARE

## 2023-09-19 DIAGNOSIS — M53.86 DECREASED RANGE OF MOTION OF INTERVERTEBRAL DISCS OF LUMBAR SPINE: Primary | ICD-10-CM

## 2023-09-19 PROCEDURE — 97530 THERAPEUTIC ACTIVITIES: CPT | Mod: HCNC,PN | Performed by: PHYSICAL THERAPIST

## 2023-09-19 PROCEDURE — 97140 MANUAL THERAPY 1/> REGIONS: CPT | Mod: HCNC,PN | Performed by: PHYSICAL THERAPIST

## 2023-09-19 PROCEDURE — 97112 NEUROMUSCULAR REEDUCATION: CPT | Mod: HCNC,PN | Performed by: PHYSICAL THERAPIST

## 2023-09-20 ENCOUNTER — CLINICAL SUPPORT (OUTPATIENT)
Dept: REHABILITATION | Facility: HOSPITAL | Age: 63
End: 2023-09-20
Payer: MEDICARE

## 2023-09-20 DIAGNOSIS — M53.86 DECREASED RANGE OF MOTION OF INTERVERTEBRAL DISCS OF LUMBAR SPINE: Primary | ICD-10-CM

## 2023-09-20 PROCEDURE — 97110 THERAPEUTIC EXERCISES: CPT | Mod: HCNC,PN | Performed by: PHYSICAL THERAPIST

## 2023-09-20 PROCEDURE — 97140 MANUAL THERAPY 1/> REGIONS: CPT | Mod: HCNC,PN | Performed by: PHYSICAL THERAPIST

## 2023-09-20 PROCEDURE — 97112 NEUROMUSCULAR REEDUCATION: CPT | Mod: HCNC,PN | Performed by: PHYSICAL THERAPIST

## 2023-09-20 NOTE — PROGRESS NOTES
"[facility]   Physical Therapy Treatment Note      Name: Veronica Ash  Clinic Number: [id number]    Therapy Diagnosis:   Encounter Diagnosis   Name Primary?    Decreased range of motion of intervertebral discs of lumbar spine Yes     Physician: Jazmín Malin MD    Visit Date: 9/20/2023    Physician Orders: PT Eval and Treat   Medical Diagnosis from Referral:   M53.3 (ICD-10-CM) - Pain of left sacroiliac joint   M54.50 (ICD-10-CM) - Acute left-sided low back pain, unspecified whether sciatica present      Evaluation Date: 9/13/2023  Authorization Period Expiration: 8/31/24  Plan of Care Expiration: 10/13/23  Progress Note Due: 10/12/23  Visit # / Visits authorized: 2/ 20 (+1)   FOTO: 3/ 5   PTA: 0/5     Precautions: Standard     Time In: 3:00 am  Time Out: 3:55 am  Total Appointment Time (timed & untimed codes): 55 1:1 minutes ([facility], [place], TEx2)      Subjective     Patient reports: a shifting in her left low back, which feels annoying  She was compliant with home exercise program.  Response to previous treatment: a couple days of improvements  Functional change: ongoing    Pain: 0/10  Location: left back      Objective      Objective Measures updated at progress report unless specified.     Treatment     Veronica received the treatments listed below:      Veronica received therapeutic exercises to develop strength, endurance, ROM, flexibility, posture, and core stabilization for 28 1:1 including:    Open books: 10x5" each  Side lying hip abduction: 2x10 each  Clamshells: 20x5" holds each  Reverse clamshells: 20x5" holds each  Bridges: 2x10 double leg      Veronica received the following manual therapy techniques: Joint mobilizations, Manual traction, Myofacial release, and Soft tissue Mobilization were applied to the: back for 10 minutes:     Bilateral prone SIJ grade V HVLAT  MET for Left anterior innominate  Grade IV-V Bilateral lumbar gapping/HVLAT     Veronica participated in " "neuromuscular re-education activities to improve: Balance, Coordination, Proprioception, and Posture for 16 1:1 minutes. The following activities were included:    Transverse abdominis bracin minutes with 5" holds  Transverse abdominis bracing with ball squeeze: 2 minutes with 5" holds  Transverse abdominis bracing with alternating marches: 2 minutes     Theraband paloff press: green theraband 20x each  Lateral shift correction (hips to right): 10x5" holds     therapeutic activities to improve functional performance for 0 1:1  minutes, including:    Hip hinge: 15x  Squats: 2x10  Single leg RDL: 10x each    Patient Education and Home Exercises       Education provided:   - body mechanics with ADLs and lifting - handout provided    Written Home Exercises Provided: Patient instructed to cont prior HEP. Exercises were reviewed and Veronica was able to demonstrate them prior to the end of the session.  Veronica demonstrated good  understanding of the education provided. See Electronic Medical Record under Patient Instructions for exercises provided during therapy sessions    Assessment     Veronica is a 63 y.o. female referred to outpatient Physical Therapy with a medical diagnosis of left SIJ pain. Pt presents with left PSIS and L5 pain. No pelvic innominate present. Left SIJ discomfort reported. After SIJ HVLAT, pt reported resolution of symptoms. Left anterior innominate present after table exercises, but corrected by MET.     Veronica Is progressing well towards her goals.   Patient prognosis is Excellent.     Patient will continue to benefit from skilled outpatient physical therapy to address the deficits listed in the problem list box on initial evaluation, provide pt/family education and to maximize pt's level of independence in the home and community environment.     Patient's spiritual, cultural and educational needs considered and pt agreeable to plan of care and goals.     Anticipated barriers to physical " therapy: none    Goals:   Short Term Goals: 2 weeks:  1. Patient will demonstrate no pelvic innominate. MET  2. Patient will demonstrate an understanding and compliance with home exercise program. MET  3. Patient will report worst pain less than 5/10 in low back for improved tolerance to ADL performance. PROGRESSING; NOT MET     Long Term Goals: 4 weeks:  1. Patient will demonstrate proper body mechanics with ADLs like vacuuming and laundry. PROGRESSING; NOT MET  2. Patient will demonstrate proper squatting body mechanics for picking up objects. PROGRESSING; NOT MET  3. Patient will improve FOTO to < 39% to improve ADL performance. PROGRESSING; NOT MET  4. Patient will report completing full work day without any not normal pains. PROGRESSING; NOT MET    Plan     Cont with POC    Plan of care Certification: 9/13/2023 to 10/13/23.     Gianfranco Smith, PT

## 2023-09-25 ENCOUNTER — CLINICAL SUPPORT (OUTPATIENT)
Dept: REHABILITATION | Facility: HOSPITAL | Age: 63
End: 2023-09-25
Payer: MEDICARE

## 2023-09-25 DIAGNOSIS — M53.86 DECREASED RANGE OF MOTION OF INTERVERTEBRAL DISCS OF LUMBAR SPINE: Primary | ICD-10-CM

## 2023-09-25 PROCEDURE — 97112 NEUROMUSCULAR REEDUCATION: CPT | Mod: HCNC,PN | Performed by: PHYSICAL THERAPIST

## 2023-09-25 PROCEDURE — 97110 THERAPEUTIC EXERCISES: CPT | Mod: HCNC,PN | Performed by: PHYSICAL THERAPIST

## 2023-09-25 PROCEDURE — 97530 THERAPEUTIC ACTIVITIES: CPT | Mod: HCNC,PN | Performed by: PHYSICAL THERAPIST

## 2023-09-25 PROCEDURE — 97140 MANUAL THERAPY 1/> REGIONS: CPT | Mod: HCNC,PN | Performed by: PHYSICAL THERAPIST

## 2023-09-25 NOTE — PROGRESS NOTES
"[facility]   Physical Therapy Treatment Note      Name: Veronica Ash  Clinic Number: [id number]    Therapy Diagnosis:   Encounter Diagnosis   Name Primary?    Decreased range of motion of intervertebral discs of lumbar spine Yes       Physician: Jazmín Malin MD    Visit Date: 9/25/2023    Physician Orders: PT Eval and Treat   Medical Diagnosis from Referral:   M53.3 (ICD-10-CM) - Pain of left sacroiliac joint   M54.50 (ICD-10-CM) - Acute left-sided low back pain, unspecified whether sciatica present      Evaluation Date: 9/13/2023  Authorization Period Expiration: 8/31/24  Plan of Care Expiration: 10/13/23  Progress Note Due: 10/12/23  Visit # / Visits authorized: 3/ 20 (+1)   FOTO: 4/ 5   PTA: 0/5     Precautions: Standard     Time In: 9:05 am  Time Out: 10:00 am  Total Appointment Time (timed & untimed codes): 54 1:1 minutes (NMR, MT, TE, TA)      Subjective     Patient reports: she had "tightness" after last visit and then went and washed her truck. She wore heels yesterday and had inc'd pain too. She also lifted 40 packs of water twice this weekend.     She was compliant with home exercise program.  Response to previous treatment: a couple days of improvements  Functional change: ongoing    Pain: 6/10  Location: left back      Objective      Objective Measures updated at progress report unless specified.     Treatment     Veronica received the treatments listed below:      Veronica received therapeutic exercises to develop strength, endurance, ROM, flexibility, posture, and core stabilization for 24 1:1 including:    Open books: 10x5" each  Side lying hip abduction: 2x10 each  Clamshells: 20x5" holds each  Reverse clamshells: 20x5" holds each  Bridges: 2x10 double leg      Veronica received the following manual therapy techniques: Joint mobilizations, Manual traction, Myofacial release, and Soft tissue Mobilization were applied to the: back for 8 minutes:     MET for " "Left anterior innominate  Grade IV-V Bilateral lumbar gapping/HVLAT    Not today:  Bilateral prone SIJ grade V HVLAT     Veronica participated in neuromuscular re-education activities to improve: Balance, Coordination, Proprioception, and Posture for 12 1:1 minutes. The following activities were included:    Transverse abdominis bracin minutes with 5" holds  Transverse abdominis bracing with ball squeeze: 2 minutes with 5" holds  Transverse abdominis bracing with alternating marches: 2 minutes     Theraband paloff press: green theraband 20x each    Not today:  Lateral shift correction (hips to right): 10x5" holds     therapeutic activities to improve functional performance for 10 1:1  minutes, including:    Hip hinge: 15x  Squats: 2x10  Single leg RDL: 10x each    Patient Education and Home Exercises       Education provided:   - body mechanics with ADLs and lifting - handout provided    Written Home Exercises Provided: Patient instructed to cont prior HEP. Exercises were reviewed and Veronica was able to demonstrate them prior to the end of the session.  Veronica demonstrated good  understanding of the education provided. See Electronic Medical Record under Patient Instructions for exercises provided during therapy sessions    Assessment     Veronica is a 63 y.o. female referred to outpatient Physical Therapy with a medical diagnosis of left SIJ pain. Pt presents with left PSIS and L5 pain. Left posterior innominate present. Negative flick test. 50% reduction in symptoms after manuals. Instructed to dec heavy lifting and strenuous activities outside of work for 1 wk. Better response to today's visit compared to last. Slight "pulling" in left low back with squatting, but good mechanics demonstrated.     Veronica Is progressing well towards her goals.   Patient prognosis is Excellent.     Patient will continue to benefit from skilled outpatient physical therapy to address the deficits listed in the problem list " box on initial evaluation, provide pt/family education and to maximize pt's level of independence in the home and community environment.     Patient's spiritual, cultural and educational needs considered and pt agreeable to plan of care and goals.     Anticipated barriers to physical therapy: none    Goals:   Short Term Goals: 2 weeks:  1. Patient will demonstrate no pelvic innominate. MET  2. Patient will demonstrate an understanding and compliance with home exercise program. MET  3. Patient will report worst pain less than 5/10 in low back for improved tolerance to ADL performance. PROGRESSING; NOT MET     Long Term Goals: 4 weeks:  1. Patient will demonstrate proper body mechanics with ADLs like vacuuming and laundry. PROGRESSING; NOT MET  2. Patient will demonstrate proper squatting body mechanics for picking up objects. PROGRESSING; NOT MET  3. Patient will improve FOTO to < 39% to improve ADL performance. PROGRESSING; NOT MET  4. Patient will report completing full work day without any not normal pains. PROGRESSING; NOT MET    Plan     Cont with POC    Plan of care Certification: 9/13/2023 to 10/13/23.     Gianfranco Smith, PT

## 2023-09-27 ENCOUNTER — DOCUMENTATION ONLY (OUTPATIENT)
Dept: REHABILITATION | Facility: HOSPITAL | Age: 63
End: 2023-09-27
Payer: MEDICARE

## 2023-09-27 RX ORDER — LEVOTHYROXINE SODIUM 100 UG/1
100 TABLET ORAL
Qty: 90 TABLET | Refills: 0 | Status: SHIPPED | OUTPATIENT
Start: 2023-09-27 | End: 2023-10-18

## 2023-09-27 NOTE — PROGRESS NOTES
Face to face meeting completed with Gianfranco Smith DPT regarding current status and progress of   Veronica Ash .   Harman Blas, PTA

## 2023-09-28 ENCOUNTER — CLINICAL SUPPORT (OUTPATIENT)
Dept: REHABILITATION | Facility: HOSPITAL | Age: 63
End: 2023-09-28
Payer: MEDICARE

## 2023-09-28 DIAGNOSIS — M53.86 DECREASED RANGE OF MOTION OF INTERVERTEBRAL DISCS OF LUMBAR SPINE: Primary | ICD-10-CM

## 2023-09-28 PROCEDURE — 97530 THERAPEUTIC ACTIVITIES: CPT | Mod: HCNC,PN | Performed by: PHYSICAL THERAPIST

## 2023-09-28 PROCEDURE — 97112 NEUROMUSCULAR REEDUCATION: CPT | Mod: HCNC,PN | Performed by: PHYSICAL THERAPIST

## 2023-09-28 NOTE — PROGRESS NOTES
"[facility]   Physical Therapy Treatment Note      Name: Veronica Ash  Clinic Number: [id number]    Therapy Diagnosis:   Encounter Diagnosis   Name Primary?    Decreased range of motion of intervertebral discs of lumbar spine Yes     Physician: Jazmín Malin MD    Visit Date: 9/28/2023    Physician Orders: PT Eval and Treat   Medical Diagnosis from Referral:   M53.3 (ICD-10-CM) - Pain of left sacroiliac joint   M54.50 (ICD-10-CM) - Acute left-sided low back pain, unspecified whether sciatica present      Evaluation Date: 9/13/2023  Authorization Period Expiration: 8/31/24  Plan of Care Expiration: 10/13/23  Progress Note Due: 10/12/23  Visit # / Visits authorized: 4/ 20 (+1)   FOTO: 5/ 5   PTA: 0/5     Precautions: Standard     Time In: 9:00 am  Time Out: 9:56 am  Total Appointment Time (timed & untimed codes): 26 1:1 minutes (NMR, TA)      Subjective     Patient reports: she had "tightness" after last visit and then went and washed her truck. She wore heels yesterday and had inc'd pain too. She also lifted 40 packs of water twice this weekend. 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    She was compliant with home exercise program.  Response to previous treatment: a couple days of improvements  Functional change: ongoing    Pain: 4/10  Location: left back      Objective      Objective Measures updated at progress report unless specified.     Treatment     Veronica received the treatments listed below:      Veronica received therapeutic exercises to develop strength, endurance, ROM, flexibility, posture, and core stabilization for supervised minutes including:    Open books: 10x5" each  Side lying hip abduction: 2x10 each  Clamshells: 20x5" holds each  Reverse clamshells: 20x5" holds each  Bridges: 2x10 double leg      Veronica received the following manual therapy techniques: Joint mobilizations, Manual traction, Myofacial release, and Soft tissue Mobilization were applied to " "the: back for 3 minutes:     Grade IV-V Bilateral lumbar gapping/HVLAT    Not today:  MET for Left anterior innominate   Bilateral prone SIJ grade V HVLAT     Veronica participated in neuromuscular re-education activities to improve: Balance, Coordination, Proprioception, and Posture for 12 1:1 minutes. The following activities were included:    Transverse abdominis bracin minutes with 5" holds  Transverse abdominis bracing with ball squeeze: 2 minutes with 5" holds  Transverse abdominis bracing with alternating marches: 2 minutes     Theraband paloff press: green theraband 20x each    Not today:  Lateral shift correction (hips to right): 10x5" holds     therapeutic activities to improve functional performance for 11 1:1  minutes, including:    Hip hinge: 15x  Squats: 2x10  Single leg RDL: 10x each    Patient Education and Home Exercises       Education provided:   - body mechanics with ADLs and lifting - handout provided    Written Home Exercises Provided: Patient instructed to cont prior HEP. Exercises were reviewed and Veronica was able to demonstrate them prior to the end of the session.  Veronica demonstrated good  understanding of the education provided. See Electronic Medical Record under Patient Instructions for exercises provided during therapy sessions    Assessment     Veronica is a 63 y.o. female referred to outpatient Physical Therapy with a medical diagnosis of left SIJ pain. Pt presents with left PSIS and L5 pain. Improved symptoms since limiting lifting and strenuous activities for this week. No pelvic innominate today. Good squatting mechanics.    Veronica Is progressing well towards her goals.   Patient prognosis is Excellent.     Patient will continue to benefit from skilled outpatient physical therapy to address the deficits listed in the problem list box on initial evaluation, provide pt/family education and to maximize pt's level of independence in the home and community environment. "     Patient's spiritual, cultural and educational needs considered and pt agreeable to plan of care and goals.     Anticipated barriers to physical therapy: none    Goals:   Short Term Goals: 2 weeks:  1. Patient will demonstrate no pelvic innominate. MET  2. Patient will demonstrate an understanding and compliance with home exercise program. MET  3. Patient will report worst pain less than 5/10 in low back for improved tolerance to ADL performance. PROGRESSING; NOT MET     Long Term Goals: 4 weeks:  1. Patient will demonstrate proper body mechanics with ADLs like vacuuming and laundry. PROGRESSING; NOT MET  2. Patient will demonstrate proper squatting body mechanics for picking up objects. PROGRESSING; NOT MET  3. Patient will improve FOTO to < 39% to improve ADL performance. PROGRESSING; NOT MET  4. Patient will report completing full work day without any not normal pains. PROGRESSING; NOT MET    Plan     Cont with POC    Plan of care Certification: 9/13/2023 to 10/13/23.     Gianfranco Smith, PT

## 2023-10-02 ENCOUNTER — CLINICAL SUPPORT (OUTPATIENT)
Dept: REHABILITATION | Facility: HOSPITAL | Age: 63
End: 2023-10-02
Payer: MEDICARE

## 2023-10-02 DIAGNOSIS — M53.86 DECREASED RANGE OF MOTION OF INTERVERTEBRAL DISCS OF LUMBAR SPINE: Primary | ICD-10-CM

## 2023-10-02 PROCEDURE — 97140 MANUAL THERAPY 1/> REGIONS: CPT | Mod: HCNC,PN | Performed by: PHYSICAL THERAPIST

## 2023-10-02 PROCEDURE — 97112 NEUROMUSCULAR REEDUCATION: CPT | Mod: HCNC,PN | Performed by: PHYSICAL THERAPIST

## 2023-10-02 PROCEDURE — 97530 THERAPEUTIC ACTIVITIES: CPT | Mod: HCNC,PN | Performed by: PHYSICAL THERAPIST

## 2023-10-02 PROCEDURE — 97110 THERAPEUTIC EXERCISES: CPT | Mod: HCNC,PN | Performed by: PHYSICAL THERAPIST

## 2023-10-02 NOTE — PROGRESS NOTES
"[facility]   Physical Therapy Treatment Note      Name: Veronica Ash  Clinic Number: [id number]    Therapy Diagnosis:   Encounter Diagnosis   Name Primary?    Decreased range of motion of intervertebral discs of lumbar spine Yes     Physician: Jazmín Malin MD    Visit Date: 10/2/2023    Physician Orders: PT Eval and Treat   Medical Diagnosis from Referral:   M53.3 (ICD-10-CM) - Pain of left sacroiliac joint   M54.50 (ICD-10-CM) - Acute left-sided low back pain, unspecified whether sciatica present      Evaluation Date: 9/13/2023  Authorization Period Expiration: 8/31/24  Plan of Care Expiration: 10/13/23  Progress Note Due: 10/12/23  Visit # / Visits authorized: 5/ 20 (+1)   FOTO: performed 10/2/23   PTA: 0/5     Precautions: Standard     Time In: 9:00 am  Time Out: 9:56 am  Total Appointment Time (timed & untimed codes): 26 1:1 minutes (NMR, TA, MT, TE)      Subjective     Patient reports: she's had a little increased pain after the yardwork yesterday    She was compliant with home exercise program.  Response to previous treatment: a couple days of improvements  Functional change: ongoing    Pain: 3-4/10  Location: left back      Objective      Objective Measures updated at progress report unless specified.     Treatment     Veronica received the treatments listed below:      Veronica received therapeutic exercises to develop strength, endurance, ROM, flexibility, posture, and core stabilization for 20 minutes including:    Open books: 10x5" each  Side lying hip abduction: 3x10 each  Clamshells: 30x5" holds yellow theraband each  Reverse clamshells: 30x5" holds each  Bridges: 2x10 double leg      Veronica received the following manual therapy techniques: Joint mobilizations, Manual traction, Myofacial release, and Soft tissue Mobilization were applied to the: back for 6 minutes:     Grade IV-V Bilateral lumbar gapping/HVLAT  Prone lumbar side glides and p-A    Not " "today:  MET for Left anterior innominate   Bilateral prone SIJ grade V HVLAT     Veronica participated in neuromuscular re-education activities to improve: Balance, Coordination, Proprioception, and Posture for 14 1:1 minutes. The following activities were included:    Transverse abdominis bracin minutes with 5" holds  Transverse abdominis bracing with ball squeeze: 2 minutes with 5" holds  Transverse abdominis bracing with alternating marches: 2 minutes   Transverse abdominis bracing with alternating upper extremity pulldowns: green theraband 20x each    Theraband paloff press: green theraband 20x each     therapeutic activities to improve functional performance for 16 1:1  minutes, including:    Hip hinge: 20x  Squats: 2x10  Single leg RDL: 10x each  Cable column push walkouts: 10x 3 pounds each side  Cable column pull walkouts: 10x 3 pounds each side    Patient Education and Home Exercises       Education provided:   - body mechanics with ADLs and lifting - handout provided    Written Home Exercises Provided: Patient instructed to cont prior HEP. Exercises were reviewed and Veronica was able to demonstrate them prior to the end of the session.  Veronica demonstrated good  understanding of the education provided. See Electronic Medical Record under Patient Instructions for exercises provided during therapy sessions    Assessment     Veronica is a 63 y.o. female referred to outpatient Physical Therapy with a medical diagnosis of left SIJ pain. Pt presents with left PSIS and L5 pain. Improved symptoms since limiting lifting and strenuous activities for this week. No pelvic innominate today. Tender to palpation left L4-5 transverse processes and PSIS. Increased pain with left to right side gliding. Progressing functional strengthening with push/pull. Subjective improvements seen on FOTO.     Veronica Is progressing well towards her goals.   Patient prognosis is Excellent.     Patient will continue to benefit from " skilled outpatient physical therapy to address the deficits listed in the problem list box on initial evaluation, provide pt/family education and to maximize pt's level of independence in the home and community environment.     Patient's spiritual, cultural and educational needs considered and pt agreeable to plan of care and goals.     Anticipated barriers to physical therapy: none    Goals:   Short Term Goals: 2 weeks:  1. Patient will demonstrate no pelvic innominate. MET  2. Patient will demonstrate an understanding and compliance with home exercise program. MET  3. Patient will report worst pain less than 5/10 in low back for improved tolerance to ADL performance. MET     Long Term Goals: 4 weeks:  1. Patient will demonstrate proper body mechanics with ADLs like vacuuming and laundry. MET  2. Patient will demonstrate proper squatting body mechanics for picking up objects. MET  3. Patient will improve FOTO to < 39% to improve ADL performance. PROGRESSING; NOT MET  4. Patient will report completing full work day without any not normal pains. PROGRESSING; NOT MET    Plan     Cont with POC    Plan of care Certification: 9/13/2023 to 10/13/23.     Gianfranco Smith, PT

## 2023-10-05 ENCOUNTER — CLINICAL SUPPORT (OUTPATIENT)
Dept: REHABILITATION | Facility: HOSPITAL | Age: 63
End: 2023-10-05
Payer: MEDICARE

## 2023-10-05 DIAGNOSIS — M53.86 DECREASED RANGE OF MOTION OF INTERVERTEBRAL DISCS OF LUMBAR SPINE: Primary | ICD-10-CM

## 2023-10-05 PROCEDURE — 97530 THERAPEUTIC ACTIVITIES: CPT | Mod: HCNC,PN | Performed by: PHYSICAL THERAPIST

## 2023-10-05 PROCEDURE — 97140 MANUAL THERAPY 1/> REGIONS: CPT | Mod: HCNC,PN | Performed by: PHYSICAL THERAPIST

## 2023-10-05 NOTE — PROGRESS NOTES
"[facility]   Physical Therapy Treatment Note      Name: Veronica Ash  Clinic Number: [id number]    Therapy Diagnosis:   Encounter Diagnosis   Name Primary?    Decreased range of motion of intervertebral discs of lumbar spine Yes     Physician: Jazmín Malin MD    Visit Date: 10/5/2023    Physician Orders: PT Eval and Treat   Medical Diagnosis from Referral:   M53.3 (ICD-10-CM) - Pain of left sacroiliac joint   M54.50 (ICD-10-CM) - Acute left-sided low back pain, unspecified whether sciatica present      Evaluation Date: 9/13/2023  Authorization Period Expiration: 8/31/24  Plan of Care Expiration: 10/13/23  Progress Note Due: 10/12/23  Visit # / Visits authorized: 6/ 20 (+1)   FOTO: performed 10/2/23   PTA: 0/5     Precautions: Standard     Time In: 9:07 am  Time Out: 10:00 am  Total Appointment Time (timed & untimed codes): 26 1:1 minutes ([place], MT)      Subjective     Patient reports: 2 days ago she went to storage with a lot of awkward reaching and twisting. She had soreness that night and yesterday. "I could feel it." She feels better today    She was compliant with home exercise program.  Response to previous treatment: a couple days of improvements  Functional change: ongoing    Pain: 2/10  Location: left back      Objective      Objective Measures updated at progress report unless specified.     Treatment     Veronica received the treatments listed below:      Veronica received therapeutic exercises to develop strength, endurance, ROM, flexibility, posture, and core stabilization for 8 1:1 and rest supervised minutes including:    Open books: 10x5" each  Side lying hip abduction: 3x10 each  Clamshells: 30x5" holds yellow theraband each  Reverse clamshells: 30x5" holds each  Bridges: 2x10 double leg      Veronica received the following manual therapy techniques: Joint mobilizations, Manual traction, Myofacial release, and Soft tissue Mobilization were applied to the: back " "for 6 minutes:     Grade IV-V Bilateral lumbar gapping/HVLAT  Prone lumbar side glides and p-A    Not today:  MET for Left anterior innominate   Bilateral prone SIJ grade V HVLAT     Veronica participated in neuromuscular re-education activities to improve: Balance, Coordination, Proprioception, and Posture for all supervised minutes. The following activities were included:    Transverse abdominis bracin minutes with 5" holds  Transverse abdominis bracing with ball squeeze: 2 minutes with 5" holds  Transverse abdominis bracing with alternating marches: 2 minutes   Transverse abdominis bracing with alternating upper extremity pulldowns: green theraband 20x each    Theraband paloff press: green theraband 20x each - not today     therapeutic activities to improve functional performance for 12 1:1  minutes, including:    Hip hinge: 20x - not today  Squats: 2x10  Single leg RDL: 10x each  Cable column push walkouts: 10x 7 pounds each side  Cable column pull walkouts: 10x 7 pounds each side    Patient Education and Home Exercises       Education provided:   - body mechanics with ADLs and lifting - handout provided    Written Home Exercises Provided: Patient instructed to cont prior HEP. Exercises were reviewed and Veronica was able to demonstrate them prior to the end of the session.  Veronica demonstrated good  understanding of the education provided. See Electronic Medical Record under Patient Instructions for exercises provided during therapy sessions    Assessment     Veronica is a 63 y.o. female referred to outpatient Physical Therapy with a medical diagnosis of left SIJ pain. Pt presents with left PSIS and L5 pain. Improved symptoms since limiting lifting and strenuous activities for this week. No pelvic innominate today. Reported one day of soreness after bending/twisting at storage unit, but symptoms improved without intervention. Progressing well with improved strength and stability.    Veronica Is " progressing well towards her goals.   Patient prognosis is Excellent.     Patient will continue to benefit from skilled outpatient physical therapy to address the deficits listed in the problem list box on initial evaluation, provide pt/family education and to maximize pt's level of independence in the home and community environment.     Patient's spiritual, cultural and educational needs considered and pt agreeable to plan of care and goals.     Anticipated barriers to physical therapy: none    Goals:   Short Term Goals: 2 weeks:  1. Patient will demonstrate no pelvic innominate. MET  2. Patient will demonstrate an understanding and compliance with home exercise program. MET  3. Patient will report worst pain less than 5/10 in low back for improved tolerance to ADL performance. MET     Long Term Goals: 4 weeks:  1. Patient will demonstrate proper body mechanics with ADLs like vacuuming and laundry. MET  2. Patient will demonstrate proper squatting body mechanics for picking up objects. MET  3. Patient will improve FOTO to < 39% to improve ADL performance. PROGRESSING; NOT MET  4. Patient will report completing full work day without any not normal pains. PROGRESSING; NOT MET    Plan     Cont with POC    Plan of care Certification: 9/13/2023 to 10/13/23.     Gianfranco Smith, PT

## 2023-10-09 ENCOUNTER — CLINICAL SUPPORT (OUTPATIENT)
Dept: REHABILITATION | Facility: HOSPITAL | Age: 63
End: 2023-10-09
Payer: MEDICARE

## 2023-10-09 ENCOUNTER — TELEPHONE (OUTPATIENT)
Dept: PODIATRY | Facility: CLINIC | Age: 63
End: 2023-10-09
Payer: MEDICARE

## 2023-10-09 DIAGNOSIS — M53.86 DECREASED RANGE OF MOTION OF INTERVERTEBRAL DISCS OF LUMBAR SPINE: Primary | ICD-10-CM

## 2023-10-09 PROCEDURE — 97530 THERAPEUTIC ACTIVITIES: CPT | Mod: HCNC,PN | Performed by: PHYSICAL THERAPIST

## 2023-10-09 PROCEDURE — 97110 THERAPEUTIC EXERCISES: CPT | Mod: HCNC,PN | Performed by: PHYSICAL THERAPIST

## 2023-10-09 NOTE — TELEPHONE ENCOUNTER
Reports concern for toenail lifting with PMHx of nail fungus and possible ingrown toenail to the left great toe. Accepted appt date and time of 10/18/23 8:30 am. No other needs voiced. Encouraged call back if needed.

## 2023-10-09 NOTE — TELEPHONE ENCOUNTER
----- Message from Christiana Cowan sent at 10/9/2023  8:54 AM CDT -----  Type:  Sooner Appointment Request    Caller is requesting a sooner appointment.  Caller declined first available appointment listed below.  Caller will not accept being placed on the waitlist and is requesting a message be sent to doctor.  Name of Caller:pt  When is the first available appointment?11/27/23  Symptoms:toe nail lifting  Would the patient rather a call back or a response via [employer]? call  Best Call Back Number:[phone]  Additional Information:

## 2023-10-09 NOTE — PROGRESS NOTES
"[facility]   Physical Therapy Treatment Note      Name: Veronica Ash  Clinic Number: [id number]    Therapy Diagnosis:   Encounter Diagnosis   Name Primary?    Decreased range of motion of intervertebral discs of lumbar spine Yes     Physician: Jazmín Malin MD    Visit Date: 10/9/2023    Physician Orders: PT Eval and Treat   Medical Diagnosis from Referral:   M53.3 (ICD-10-CM) - Pain of left sacroiliac joint   M54.50 (ICD-10-CM) - Acute left-sided low back pain, unspecified whether sciatica present      Evaluation Date: 9/13/2023  Authorization Period Expiration: 8/31/24  Plan of Care Expiration: 10/13/23  Progress Note Due: 10/12/23  Visit # / Visits authorized: 7/ 20 (+1)   FOTO: performed 10/2/23   PTA: 0/5     Precautions: Standard     Time In: 9:00 am  Time Out: 9:50 am  Total Appointment Time (timed & untimed codes): 28 1:1 minutes (BONNIE KNOX)      Subjective     Patient reports: she did a wedding and did ok Pain never got bad. She did it a little differently than she used to.    She was compliant with home exercise program.  Response to previous treatment: a couple days of improvements  Functional change: ongoing    Pain: 2/10  Location: left back      Objective      Objective Measures updated at progress report unless specified.     Treatment     Veronica received the treatments listed below:      Veronica received therapeutic exercises to develop strength, endurance, ROM, flexibility, posture, and core stabilization for 14 1:1 and rest supervised minutes including:    Open books: 10x5" each  Side lying hip abduction: 3x10 each  Clamshells: 30x5" holds yellow theraband each  Reverse clamshells: 30x5" holds each  Bridges: 3x10 double leg      Veronica received the following manual therapy techniques: Joint mobilizations, Manual traction, Myofacial release, and Soft tissue Mobilization were applied to the: back for 0 minutes:     Grade IV-V Bilateral lumbar " "gapping/HVLAT  Prone lumbar side glides and p-A    Not today:  MET for Left anterior innominate   Bilateral prone SIJ grade V HVLAT     Veronica participated in neuromuscular re-education activities to improve: Balance, Coordination, Proprioception, and Posture for all supervised minutes. The following activities were included:    Transverse abdominis bracing with ball squeeze: 2 minutes with 5" holds  Transverse abdominis bracing with alternating marches: 2 minutes   Transverse abdominis bracing with alternating upper extremity pulldowns: green theraband 20x each    Theraband paloff press: green theraband 20x each     therapeutic activities to improve functional performance for 14 1:1  minutes, including:    Hip hinge: 20x - not today  Squats: 2x10  Single leg RDL: 10x each  Cable column push walkouts: 10x 7 pounds each side  Cable column pull walkouts: 10x 7 pounds each side    Patient Education and Home Exercises       Education provided:   - body mechanics with ADLs and lifting - handout provided    Written Home Exercises Provided: Patient instructed to cont prior HEP. Exercises were reviewed and Veronica was able to demonstrate them prior to the end of the session.  Veronica demonstrated good  understanding of the education provided. See Electronic Medical Record under Patient Instructions for exercises provided during therapy sessions    Assessment     Veronica is a 63 y.o. female referred to outpatient Physical Therapy with a medical diagnosis of left SIJ pain. Pt presents with left PSIS and L5 pain. Improved work and functional activity tolerance. Held manuals due to next visit being planned last day. Mild increase in "tenderness" by completion of visit.       Veronica Is progressing well towards her goals.   Patient prognosis is Excellent.     Patient will continue to benefit from skilled outpatient physical therapy to address the deficits listed in the problem list box on initial evaluation, provide " pt/family education and to maximize pt's level of independence in the home and community environment.     Patient's spiritual, cultural and educational needs considered and pt agreeable to plan of care and goals.     Anticipated barriers to physical therapy: none    Goals:   Short Term Goals: 2 weeks:  1. Patient will demonstrate no pelvic innominate. MET  2. Patient will demonstrate an understanding and compliance with home exercise program. MET  3. Patient will report worst pain less than 5/10 in low back for improved tolerance to ADL performance. MET     Long Term Goals: 4 weeks:  1. Patient will demonstrate proper body mechanics with ADLs like vacuuming and laundry. MET  2. Patient will demonstrate proper squatting body mechanics for picking up objects. MET  3. Patient will improve FOTO to < 39% to improve ADL performance. PROGRESSING; NOT MET  4. Patient will report completing full work day without any not normal pains. PROGRESSING; NOT MET    Plan     Cont with POC. Plan discharge next visit    Plan of care Certification: 9/13/2023 to 10/13/23.     Gianfranco Smith, PT

## 2023-10-12 ENCOUNTER — CLINICAL SUPPORT (OUTPATIENT)
Dept: REHABILITATION | Facility: HOSPITAL | Age: 63
End: 2023-10-12
Payer: MEDICARE

## 2023-10-12 DIAGNOSIS — M53.86 DECREASED RANGE OF MOTION OF INTERVERTEBRAL DISCS OF LUMBAR SPINE: Primary | ICD-10-CM

## 2023-10-12 PROCEDURE — 97110 THERAPEUTIC EXERCISES: CPT | Mod: HCNC,PN | Performed by: PHYSICAL THERAPIST

## 2023-10-12 NOTE — PROGRESS NOTES
CC: Hyperlipidemia & Hypertension    Visit: Subsequent    SUBJECTIVE  HPI Sade Lyle is 72 year old female  who presents today for evaluation and treatment of hyperlipidemia and hypertension    Hypertension:  The problem has been present for several years.  Patient reports  Control has been high  Has been treated with medication - see meds list, low fat diet and low sodium diet.  Cardiovascular risk factors: lipids.  Associated symptoms: denies chest pain, palpitations, dyspnea,  orthopnea, PND and dizziness  Low-fat diet: Yes    Hyperlipidemia: The problem has been present for several years.  Patient reports  Control has been satisfactory.    Has been treated with diet.        Patient's barriers to care plan: No    Does the patient have the ability to self monitor/manage and comply to their treatment plan:Yes     Compliance to treatment plan: Yes    Medication adherence:  Patient reports adherence to dosing schedules Yes  Side effects of medication: No    He notes no other additional concerns at this time.    Sade has a past medical history of Anxiety (1/7/2011), DDD (degenerative disc disease), lumbar (9/17/2015), Essential hypertension (1/18/2012), History of acute pancreatitis, History of Guillain-[place] syndrome, Hyperlipidemia (2/15/2001), Macular degeneration (10/3/2013), Nuclear age-related cataract, both eyes (3/30/2016), Pancreatic cyst (7/16/2014), Postmenopausal osteoporosis (7/14/2015), Pulmonary nodule, Rosacea (7/16/2015), Thoracic and lumbosacral neuritis (6/12/2018), and Vitamin D insufficiency (10/10/2014).  Sade has a past surgical history that includes Tubal ligation; Total abdominal hysterectomy; and Gallbladder surgery.  Her family history includes Cancer in her mother; Diabetes in her father; Heart in her father.  Sade reports that she has never smoked. She has never used smokeless tobacco.  Sade has a current medication list which includes the following prescription(s):  [facility]   Physical Therapy Treatment Note      Name: Veronica Ash  Clinic Number: [id number]    Therapy Diagnosis:   Encounter Diagnosis   Name Primary?    Decreased range of motion of intervertebral discs of lumbar spine Yes     Physician: Jazmín Malin MD    Visit Date: 10/12/2023    Physician Orders: PT Eval and Treat   Medical Diagnosis from Referral:   M53.3 (ICD-10-CM) - Pain of left sacroiliac joint   M54.50 (ICD-10-CM) - Acute left-sided low back pain, unspecified whether sciatica present      Evaluation Date: 9/13/2023  Authorization Period Expiration: 8/31/24  Plan of Care Expiration: 10/13/23  Progress Note Due: 10/12/23  Visit # / Visits authorized: 7/ 20 (+1)   FOTO: performed 10/2/23   PTA: 0/5     Precautions: Standard     Time In: 9:00 am  Time Out: 9:30 am  Total Appointment Time (timed & untimed codes): 30 1:1 minutes (TEx2)      Subjective     Patient reports: no new changes since last visit. She's overall feeling well and ready for discharge.     She was compliant with home exercise program.  Response to previous treatment: a couple days of improvements  Functional change: ongoing    Pain: 1/10  Location: left back      Objective      AROM: () = 10/12/23    Degrees Pain/Dysfunction   Flexion 5 cm Repeated: NP   Extension 28 (45) Mild central LBP    Right Rotation WNL NP   Left Rotation WNL NP   Right Side Bending 43 cm NP   Left Side Bending 43 cm NP     Lateral step down test: + right for mild valgus collapse. Able to correct with cues  Appropriate body mechanics with squatting, golfer's pickup, hip hinge    Treatment     Veronica received the treatments listed below:      Veronica received therapeutic exercises to develop strength, endurance, ROM, flexibility, posture, and core stabilization for 30 1:1 minutes including:    Objective measures  Updated HEP    Patient Education and Home Exercises       Education provided:   - body mechanics with ADLs  and lifting - handout provided  - updated HEP    Written Home Exercises Provided: Patient instructed to cont prior HEP. Exercises were reviewed and Veronica was able to demonstrate them prior to the end of the session.  Veronica demonstrated good  understanding of the education provided. See Electronic Medical Record under Patient Instructions for exercises provided during therapy sessions    Assessment     Veronica is a 63 y.o. female referred to outpatient Physical Therapy with a medical diagnosis of left SIJ pain. Pt presents with left PSIS and L5 pain. She has completed 9 PT visits and demonstrates good lower extremity strength and body mechanics. She has met all goals at this time. She is appropriate and agreeable to discharge at this time.       Veronica Is progressing well towards her goals.   Patient prognosis is Excellent.     Patient will continue to benefit from skilled outpatient physical therapy to address the deficits listed in the problem list box on initial evaluation, provide pt/family education and to maximize pt's level of independence in the home and community environment.     Patient's spiritual, cultural and educational needs considered and pt agreeable to plan of care and goals.     Anticipated barriers to physical therapy: none    Goals:   Short Term Goals: 2 weeks:  1. Patient will demonstrate no pelvic innominate. MET  2. Patient will demonstrate an understanding and compliance with home exercise program. MET  3. Patient will report worst pain less than 5/10 in low back for improved tolerance to ADL performance. MET     Long Term Goals: 4 weeks:  1. Patient will demonstrate proper body mechanics with ADLs like vacuuming and laundry. MET  2. Patient will demonstrate proper squatting body mechanics for picking up objects. MET  3. Patient will improve FOTO to < 39% to improve ADL performance. MET  4. Patient will report completing full work day without any not normal pains. MET    Plan  naproxen, vitamin - therapeutic multivitamins w/minerals, coenzyme q10, atenolol, estrogens, conjugated, and ergocalciferol.  Sade is allergic to levaquin; lisinopril; citalopram; alendronic acid; ciprofloxacin; fosamax; and metronidazole.    Review of Systems    Review of Systems   Constitutional: Negative for fever and chills.   Skin: Negative for rash.   HENT: Negative for headaches.    Cardiovascular: Negative for chest pain.   Respiratory: Is not experiencing shortness of breath.   Gastrointestinal: Negative for nausea and vomiting.   Musculoskeletal: Negative for myalgias.   Neurological: Negative for dizziness and loss of consciousness.   Other systems not mentioned reviewed and all negative.     Objective   Visit Vitals  BP (!) 148/72   Pulse 78   Ht 5' 3\" (1.6 m)   Wt 50.8 kg (112 lb)   BMI 19.84 kg/m²      General-alert, no acute distress alert and oriented x3  HEENT- pupils are equally round and reactive to light   sclera are nonicteric.  TMs are normal.  EACs are normal.    Oropharynx is clear and moist.  No tonsillar enlargement or exudate  Neck- trachea is midline  Heart-regular rate and rhythm, no murmurs rubs or gallops  Lungs- clear to auscultation bilaterally.  No respiratory distress  Abdomen- soft, nontender, nondistended.    Extremities- no  edema.  No calf redness or tenderness  Neuro-   Normal range of motion of extremities, no weakness, normal gait  Skin-warm and dry, no lesions or rashes noted     ASSESSMENT AND PLAN     Postmenopausal osteoporosis  (primary encounter diagnosis)  Comment: no recent fractures  Plan: VITAMIN D -25 HYDROXY        continue calcium 1200mg daily,  this includes diet and tablets,and vitamin d 800 units and weight bearing exercises  Fall prevention is extremely important. Any objects at home in her walkways (e.g., throw rugs, electric cords, pets) that could increase her chances of falling should be removed. She must also have her vision checked      DC    Plan of care Certification: 9/13/2023 to 10/13/23.     Gianfranco Smith, PT       annually        Essential hypertension  Comment: high  Plan: atenolol (TENORMIN) 50 MG tablet        asymptomatic  Increase atenolol to 50mg  Nurse visit in 10-14 days for bp check if patient can not find her home bp cuff    DDD (degenerative disc disease), lumbar  Comment: stable  Plan: naproxen (NAPROSYN) 500 MG tablet        No current alarm or radicular symptoms    Vitamin D insufficiency  Comment: asymptomatic    Plan: VITAMIN D -25 HYDROXY            Hyperlipidemia LDL goal <100  Comment: lipid ordered  Plan: CBC WITH DIFFERENTIAL, COMPREHENSIVE MET PNL         (FAST), LIPID PANEL WITH REFLEX, THYROID         STIMULATING HORMONE REFLEX        low cholesterol and high fiber diet encouraged      Atypical ductal hyperplasia of breast  Comment: reviewed work up, negative for malignancy  Plan: repeat mammogram 12/2020    Symptomatic menopausal or female climacteric states  Plan: BD DEXA AXIAL SKELETON            Medicare annual wellness visit, subsequent  Comment: see note      Breast cancer screening  Comment: asymptomatic    Plan: MAMMO SCREENING BILATERAL W [facility]ERYL            Colon cancer screening  Comment: refuses colonoscopy as she is asymptomatic  Plan: cologuard ordered         Medication changes: Yes.  Patient was advised on side effects and interactions of the new medication.  Also understands risks of not taking medications or adhering to medication schedule.    Follow up with me in 1 year      Discussed treatment options, plan with patient and all questions answered.  Also discussed his current diet and exercise requirements and encouraged healthy lifestyle to reduce potential for serious health complications.    The patient was instructed to call if any questions or problems about his hypertension or hyperlipidemia.

## 2023-10-18 ENCOUNTER — OFFICE VISIT (OUTPATIENT)
Dept: PODIATRY | Facility: CLINIC | Age: 63
End: 2023-10-18
Payer: MEDICARE

## 2023-10-18 VITALS
SYSTOLIC BLOOD PRESSURE: 136 MMHG | DIASTOLIC BLOOD PRESSURE: 81 MMHG | HEIGHT: 64 IN | HEART RATE: 53 BPM | WEIGHT: 129 LBS | BODY MASS INDEX: 22.02 KG/M2

## 2023-10-18 DIAGNOSIS — L60.3 ONYCHODYSTROPHY: ICD-10-CM

## 2023-10-18 DIAGNOSIS — L60.0 ONYCHOCRYPTOSIS: Primary | ICD-10-CM

## 2023-10-18 PROCEDURE — 3288F PR FALLS RISK ASSESSMENT DOCUMENTED: ICD-10-PCS | Mod: HCNC,CPTII,S$GLB, | Performed by: PODIATRIST

## 2023-10-18 PROCEDURE — 99203 PR OFFICE/OUTPT VISIT, NEW, LEVL III, 30-44 MIN: ICD-10-PCS | Mod: HCNC,S$GLB,, | Performed by: PODIATRIST

## 2023-10-18 PROCEDURE — 1160F RVW MEDS BY RX/DR IN RCRD: CPT | Mod: HCNC,CPTII,S$GLB, | Performed by: PODIATRIST

## 2023-10-18 PROCEDURE — 1159F PR MEDICATION LIST DOCUMENTED IN MEDICAL RECORD: ICD-10-PCS | Mod: HCNC,CPTII,S$GLB, | Performed by: PODIATRIST

## 2023-10-18 PROCEDURE — 1101F PT FALLS ASSESS-DOCD LE1/YR: CPT | Mod: HCNC,CPTII,S$GLB, | Performed by: PODIATRIST

## 2023-10-18 PROCEDURE — 1159F MED LIST DOCD IN RCRD: CPT | Mod: HCNC,CPTII,S$GLB, | Performed by: PODIATRIST

## 2023-10-18 PROCEDURE — 3008F PR BODY MASS INDEX (BMI) DOCUMENTED: ICD-10-PCS | Mod: HCNC,CPTII,S$GLB, | Performed by: PODIATRIST

## 2023-10-18 PROCEDURE — 3079F DIAST BP 80-89 MM HG: CPT | Mod: HCNC,CPTII,S$GLB, | Performed by: PODIATRIST

## 2023-10-18 PROCEDURE — 99999 PR PBB SHADOW E&M-EST. PATIENT-LVL III: CPT | Mod: PBBFAC,HCNC,, | Performed by: PODIATRIST

## 2023-10-18 PROCEDURE — 1160F PR REVIEW ALL MEDS BY PRESCRIBER/CLIN PHARMACIST DOCUMENTED: ICD-10-PCS | Mod: HCNC,CPTII,S$GLB, | Performed by: PODIATRIST

## 2023-10-18 PROCEDURE — 3288F FALL RISK ASSESSMENT DOCD: CPT | Mod: HCNC,CPTII,S$GLB, | Performed by: PODIATRIST

## 2023-10-18 PROCEDURE — 1101F PR PT FALLS ASSESS DOC 0-1 FALLS W/OUT INJ PAST YR: ICD-10-PCS | Mod: HCNC,CPTII,S$GLB, | Performed by: PODIATRIST

## 2023-10-18 PROCEDURE — 3075F PR MOST RECENT SYSTOLIC BLOOD PRESS GE 130-139MM HG: ICD-10-PCS | Mod: HCNC,CPTII,S$GLB, | Performed by: PODIATRIST

## 2023-10-18 PROCEDURE — 99203 OFFICE O/P NEW LOW 30 MIN: CPT | Mod: HCNC,S$GLB,, | Performed by: PODIATRIST

## 2023-10-18 PROCEDURE — 3008F BODY MASS INDEX DOCD: CPT | Mod: HCNC,CPTII,S$GLB, | Performed by: PODIATRIST

## 2023-10-18 PROCEDURE — 99999 PR PBB SHADOW E&M-EST. PATIENT-LVL III: ICD-10-PCS | Mod: PBBFAC,HCNC,, | Performed by: PODIATRIST

## 2023-10-18 PROCEDURE — 3075F SYST BP GE 130 - 139MM HG: CPT | Mod: HCNC,CPTII,S$GLB, | Performed by: PODIATRIST

## 2023-10-18 PROCEDURE — 3079F PR MOST RECENT DIASTOLIC BLOOD PRESSURE 80-89 MM HG: ICD-10-PCS | Mod: HCNC,CPTII,S$GLB, | Performed by: PODIATRIST

## 2023-10-18 NOTE — PROGRESS NOTES
"Subjective:     Patient ID: Veronica Ash is a 63 y.o. female.    Chief Complaint: Nail Problem (Bilateral hallux toenails are lifting)    Presents today complaining of abnormal growth of her big toenails and curvature.  States that she was treated for nail fungus per  with an oral course Lamisil and noted that afterwards the nails were growing in a superior direction.  Also states that she recently hit her left great toenail against a door.    Vitals:    10/18/23 0812   BP: 136/81   Pulse: (!) 53   Weight: 58.5 kg (129 lb)   Height: 5' 4" (1.626 m)   PainSc: 0-No pain      Past Medical History:   Diagnosis Date    Abnormal Pap smear of cervix     Depression     GERD (gastroesophageal reflux disease)     Melanoma     Thyroid disease     Vertigo        Past Surgical History:   Procedure Laterality Date    CHOLECYSTECTOMY      COLONOSCOPY N/A 4/27/2023    Procedure: COLONOSCOPY Suprep;  Surgeon: Galo Powers MD;  Location: [facility];  Service: Endoscopy;  Laterality: N/A;    EXCISION OF MELANOMA Left     ankle with removal of 2 lymph nodes    GYNECOLOGIC CRYOSURGERY  1980       Family History   Problem Relation Age of Onset    Stomach cancer Mother     Brain cancer Mother     Lung cancer Father     Ovarian cancer Sister 67       Social History     Socioeconomic History    Marital status:    Tobacco Use    Smoking status: Never     Passive exposure: Never    Smokeless tobacco: Never   Substance and Sexual Activity    Alcohol use: Yes     Comment: once per week    Drug use: Never    Sexual activity: Yes     Partners: Male       Current Outpatient Medications   Medication Sig Dispense Refill    citalopram (CELEXA) 40 MG tablet Take 1 tablet (40 mg total) by mouth once daily. 90 tablet 0    clobetasol 0.05% (TEMOVATE) 0.05 % Oint Apply topically 2 (two) times daily. 45 g 1    levothyroxine (SYNTHROID) 112 MCG tablet Take 1 tablet (112 mcg total) by mouth before breakfast. 30 tablet 3    " meclizine (ANTIVERT) 25 mg tablet Take 1 tablet (25 mg total) by mouth 3 (three) times daily as needed for Dizziness or Nausea. 60 tablet 1    meloxicam (MOBIC) 15 MG tablet Take 1 tablet (15 mg total) by mouth once daily. 90 tablet 0    prasterone, dhea, (INTRAROSA) 6.5 mg Inst Place 6.5 mg vaginally every evening. 30 each 6    progesterone (PROMETRIUM) 200 MG capsule Take 1 capsule (200 mg total) by mouth every evening. 90 capsule 0    albuterol (PROVENTIL/VENTOLIN HFA) 90 mcg/actuation inhaler Inhale 1-2 puffs into the lungs every 6 (six) hours as needed for Wheezing. 18 g 0    levothyroxine (SYNTHROID) 100 MCG tablet Take 1 tablet (100 mcg total) by mouth before breakfast. 90 tablet 0     No current facility-administered medications for this visit.       Review of patient's allergies indicates:   Allergen Reactions    Aspirin      Other reaction(s): whelps & swelling    Sulfa (sulfonamide antibiotics) Rash         Review of Systems   Constitutional: Negative for chills, fever and malaise/fatigue.   Cardiovascular:  Negative for chest pain, claudication and leg swelling.   Respiratory:  Negative for cough and shortness of breath.    Skin:  Positive for nail changes.   Musculoskeletal:  Negative for back pain, joint pain, muscle cramps and muscle weakness.   Gastrointestinal:  Negative for nausea and vomiting.   Neurological:  Negative for numbness, paresthesias and weakness.   Psychiatric/Behavioral:  Negative for altered mental status.         Objective:     Physical Exam  Constitutional:       General: She is not in acute distress.     Appearance: Normal appearance. She is not ill-appearing.   Cardiovascular:      Pulses:           Dorsalis pedis pulses are 2+ on the right side and 2+ on the left side.        Posterior tibial pulses are 2+ on the right side and 2+ on the left side.      Comments: Skin temp warm bilateral foot.  No rubor on dependency bilateral foot.  Digital hair growth  present.  Musculoskeletal:      Comments: Hallux abductovalgus overlapping the 2nd toe bilateral.   Skin:     General: Skin is warm.      Capillary Refill: Capillary refill takes less than 2 seconds.      Findings: No ecchymosis or erythema.      Nails: There is no clubbing.      Comments: Right hallux nail bed appears hypertrophic with the nail severely elevated along the distal 1/2 and incurvated along the medial lateral nail borders with moderate thickening.  The left hallux nail is moderately thickened and also loosened/not attached to the distal 1/2 of the nailbed and also growing in a superior direction.   Neurological:      Mental Status: She is alert.           Assessment:      Encounter Diagnoses   Name Primary?    Onychocryptosis Yes    Onychodystrophy      Plan:     Veronica was seen today for nail problem.    Diagnoses and all orders for this visit:    Onychocryptosis    Onychodystrophy      I counseled the patient on her conditions, their implications and medical management.    We discussed that most likely she had trauma to both great toenails which could have been repetitive trauma from shoe gear that cause the permanent damage to the nailbeds and that she may have underlying bone spurs of the distal phalanx which contributed to this.  We discussed different treatment options such as wearing extra-depth shoes and keeping the nails trimmed short and file down versus surgical intervention consisting of complete nail avulsion with phenol and alcohol matrixectomy.  Risks, benefits anticipate postop course discussed in detail.  No guarantees were given or implied.  Patient elected proceed with the complete nail avulsion of the hallux nail bilateral with phenol and alcohol matrixectomy.  In addition we briefly discussed that in younger patients and exostectomy with nail avulsion could be attempted in an attempt to try to get the nail grow back normally but this would not be successful in her case.    With  the patient's verbal consent a sterile nail Nipper was utilized to trim the hallux nail bilateral in order to assist the patient and to keep it from receiving further direct trauma while in shoe gear.  She tolerated procedure well without apparent complication.      We discussed appropriate sizing shoes in detail.      RTC p.r.n. as discussed to receive the procedure to both great toenails    A portion of this note was generated by voice recognition software and may contain spelling and grammar errors.      .

## 2023-11-07 DIAGNOSIS — E03.9 HYPOTHYROIDISM, UNSPECIFIED TYPE: ICD-10-CM

## 2023-11-07 RX ORDER — LEVOTHYROXINE SODIUM 112 UG/1
112 TABLET ORAL
Qty: 30 TABLET | Refills: 0 | Status: SHIPPED | OUTPATIENT
Start: 2023-11-07 | End: 2023-11-16 | Stop reason: SDUPTHER

## 2023-11-11 DIAGNOSIS — F32.A ANXIETY AND DEPRESSION: ICD-10-CM

## 2023-11-11 DIAGNOSIS — F41.9 ANXIETY AND DEPRESSION: ICD-10-CM

## 2023-11-11 NOTE — TELEPHONE ENCOUNTER
Refill Routing Note   Medication(s) are not appropriate for processing by [facility] for the following reason(s):      Non-participating provider    [facility] action(s):  Route Care Due:  None identified            Appointments  past 12m or future 3m with PCP    Date Provider   Last Visit   1/10/2023 Yohannes Arevalo MD   Next Visit   Visit date not found Yohannes Arevalo MD   ED visits in past 90 days: 0        Note composed:9:02 AM 11/11/2023

## 2023-11-14 RX ORDER — CITALOPRAM 40 MG/1
40 TABLET, FILM COATED ORAL
Qty: 90 TABLET | Refills: 10 | Status: SHIPPED | OUTPATIENT
Start: 2023-11-14 | End: 2023-11-16 | Stop reason: SDUPTHER

## 2023-11-14 RX ORDER — MELOXICAM 15 MG/1
15 TABLET ORAL
Qty: 90 TABLET | Refills: 10 | Status: SHIPPED | OUTPATIENT
Start: 2023-11-14 | End: 2023-11-16 | Stop reason: SDUPTHER

## 2023-11-16 DIAGNOSIS — F32.A ANXIETY AND DEPRESSION: ICD-10-CM

## 2023-11-16 DIAGNOSIS — E03.9 HYPOTHYROIDISM, UNSPECIFIED TYPE: ICD-10-CM

## 2023-11-16 DIAGNOSIS — Z00.00 ANNUAL PHYSICAL EXAM: Primary | ICD-10-CM

## 2023-11-16 DIAGNOSIS — F41.9 ANXIETY AND DEPRESSION: ICD-10-CM

## 2023-11-16 RX ORDER — LEVOTHYROXINE SODIUM 112 UG/1
112 TABLET ORAL
Qty: 30 TABLET | Refills: 0 | Status: CANCELLED | OUTPATIENT
Start: 2023-11-16

## 2023-11-16 RX ORDER — MELOXICAM 15 MG/1
15 TABLET ORAL DAILY
Qty: 90 TABLET | Refills: 0 | Status: SHIPPED | OUTPATIENT
Start: 2023-11-16

## 2023-11-16 RX ORDER — CITALOPRAM 40 MG/1
40 TABLET, FILM COATED ORAL DAILY
Qty: 90 TABLET | Refills: 10 | Status: SHIPPED | OUTPATIENT
Start: 2023-11-16

## 2023-11-16 RX ORDER — LEVOTHYROXINE SODIUM 112 UG/1
112 TABLET ORAL
Qty: 30 TABLET | Refills: 0 | Status: SHIPPED | OUTPATIENT
Start: 2023-11-16 | End: 2023-12-13

## 2023-11-20 RX ORDER — PROGESTERONE 200 MG/1
200 CAPSULE ORAL NIGHTLY
Qty: 90 CAPSULE | Refills: 10 | Status: SHIPPED | OUTPATIENT
Start: 2023-11-20

## 2023-11-20 NOTE — TELEPHONE ENCOUNTER
Refill Routing Note   Medication(s) are not appropriate for processing by [facility] for the following reason(s):        Outside of protocol    [facility] action(s):  Route               Appointments  past 12m or future 3m with PCP    Date Provider   Last Visit   Visit date not found Arnulfo Lal MD   Next Visit   Visit date not found Arnulfo Lal MD   ED visits in past 90 days: 0        Note composed:1:47 PM 11/20/2023

## 2023-12-12 ENCOUNTER — HOSPITAL ENCOUNTER (OUTPATIENT)
Dept: RADIOLOGY | Facility: HOSPITAL | Age: 63
Discharge: HOME | End: 2023-12-12
Attending: FAMILY MEDICINE
Payer: MEDICARE

## 2023-12-12 DIAGNOSIS — Z00.00 ANNUAL PHYSICAL EXAM: ICD-10-CM

## 2023-12-12 PROCEDURE — 77063 MAMMO DIGITAL SCREENING BILAT WITH TOMO: ICD-10-PCS | Mod: 26,HCNC,, | Performed by: RADIOLOGY

## 2023-12-12 PROCEDURE — 77063 BREAST TOMOSYNTHESIS BI: CPT | Mod: 26,HCNC,, | Performed by: RADIOLOGY

## 2023-12-12 PROCEDURE — 77067 SCR MAMMO BI INCL CAD: CPT | Mod: TC,HCNC

## 2023-12-12 PROCEDURE — 77067 MAMMO DIGITAL SCREENING BILAT WITH TOMO: ICD-10-PCS | Mod: 26,HCNC,, | Performed by: RADIOLOGY

## 2023-12-12 PROCEDURE — 77067 SCR MAMMO BI INCL CAD: CPT | Mod: 26,HCNC,, | Performed by: RADIOLOGY

## 2023-12-13 DIAGNOSIS — E03.9 HYPOTHYROIDISM, UNSPECIFIED TYPE: ICD-10-CM

## 2023-12-13 RX ORDER — LEVOTHYROXINE SODIUM 112 UG/1
112 TABLET ORAL
Qty: 30 TABLET | Refills: 3 | Status: SHIPPED | OUTPATIENT
Start: 2023-12-13 | End: 2024-02-26

## 2023-12-13 NOTE — TELEPHONE ENCOUNTER
Refill Routing Note   Medication(s) are not appropriate for processing by [facility] for the following reason(s):        Non-participating provider    [facility] action(s):  Route             Extended chart review required: Yes     Appointments  past 12m or future 3m with PCP    Date Provider   Last Visit   9/1/2023 Jazmín Malin MD   Next Visit   Visit date not found Jazmín Malin MD   ED visits in past 90 days: 0        Note composed:12:37 PM 12/13/2023

## 2023-12-19 ENCOUNTER — TELEPHONE (OUTPATIENT)
Dept: PODIATRY | Facility: CLINIC | Age: 63
End: 2023-12-19
Payer: MEDICARE

## 2023-12-19 NOTE — TELEPHONE ENCOUNTER
Spoke with Ms Ash to inform her that Dr Armstrong will not be in clinic on Jan 5 due to surgery. Accepted new appt date and time of  1/25/24 at 1 pm. Will also send appt reminder through the mail as well. No other needs voiced. Encouraged call back if needed.

## 2024-02-05 ENCOUNTER — TELEPHONE (OUTPATIENT)
Dept: PODIATRY | Facility: CLINIC | Age: 64
End: 2024-02-05
Payer: MEDICARE

## 2024-02-05 NOTE — TELEPHONE ENCOUNTER
----- Message from Rosa Maria Soto sent at 2/5/2024 10:20 AM CST -----  Type:  Needs Medical Advice    Who Called: patient  Would the patient rather a call back or a response via [employer]? Call    Best Call Back Number: [phone]  Additional Information: She needs to reschedule procedure.

## 2024-02-05 NOTE — TELEPHONE ENCOUNTER
Spoke with Ms Ash who requested that her ingrown toenail procedure be moved to April 12. Accepted new appt date and time of 4/12/24 t 3:15 pm with no other needs voiced at this time. Encouraged call back if needed.

## 2024-02-26 DIAGNOSIS — E03.9 HYPOTHYROIDISM, UNSPECIFIED TYPE: ICD-10-CM

## 2024-02-26 RX ORDER — LEVOTHYROXINE SODIUM 112 UG/1
112 TABLET ORAL
Qty: 90 TABLET | Refills: 3 | Status: SHIPPED | OUTPATIENT
Start: 2024-02-26

## 2024-04-30 PROBLEM — K52.9 CHRONIC DIARRHEA: Status: ACTIVE | Noted: 2024-04-30

## 2024-04-30 PROBLEM — F41.9 ANXIETY AND DEPRESSION: Status: ACTIVE | Noted: 2024-04-30

## 2024-04-30 PROBLEM — K21.9 GASTROESOPHAGEAL REFLUX DISEASE: Status: ACTIVE | Noted: 2024-04-30

## 2024-04-30 PROBLEM — F32.A ANXIETY AND DEPRESSION: Status: ACTIVE | Noted: 2024-04-30

## 2024-04-30 PROBLEM — E03.9 HYPOTHYROIDISM: Status: ACTIVE | Noted: 2024-04-30

## 2024-04-30 NOTE — PROGRESS NOTES
Veronica Ash presented for a  Medicare AWV and comprehensive Health Risk Assessment today. The following components were reviewed and updated:    Medical history  Family History  Social history  Allergies and Current Medications  Health Risk Assessment  Health Maintenance  Care Team         ** See Completed Assessments for Annual Wellness Visit within the encounter summary.**         The following assessments were completed:  Living Situation  CAGE  Depression Screening  Timed Get Up and Go  Whisper Test  Cognitive Function Screening -   Nutrition Screening  ADL Screening  PAQ Screening      Review for Opioid Screening: Pt does not have Rx for Opioids      Review for Substance Use Disorders: Patient does not use substance        Current Outpatient Medications:     citalopram (CELEXA) 40 MG tablet, Take 1 tablet (40 mg total) by mouth once daily., Disp: 90 tablet, Rfl: 10    clobetasol 0.05% (TEMOVATE) 0.05 % Oint, Apply topically 2 (two) times daily., Disp: 45 g, Rfl: 1    levothyroxine (SYNTHROID) 112 MCG tablet, TAKE 1 TABLET BEFORE BREAKFAST, Disp: 90 tablet, Rfl: 3    meclizine (ANTIVERT) 25 mg tablet, Take 1 tablet (25 mg total) by mouth 3 (three) times daily as needed for Dizziness or Nausea., Disp: 60 tablet, Rfl: 1    melatonin 5 mg Chew, Take by mouth., Disp: , Rfl:     meloxicam (MOBIC) 15 MG tablet, Take 1 tablet (15 mg total) by mouth once daily., Disp: 90 tablet, Rfl: 0    omega-3 fatty acids/fish oil (FISH OIL-OMEGA-3 FATTY ACIDS) 300-1,000 mg capsule, Take by mouth once daily., Disp: , Rfl:     prasterone, dhea, (INTRAROSA) 6.5 mg Inst, Place 6.5 mg vaginally every evening., Disp: 30 each, Rfl: 6    progesterone (PROMETRIUM) 200 MG capsule, TAKE 1 CAPSULE (200 MG TOTAL) BY MOUTH EVERY EVENING., Disp: 90 capsule, Rfl: 10    TURMERIC ORAL, Take by mouth., Disp: , Rfl:     vitamin D (VITAMIN D3) 1000 units Tab, Take 5,000 Units by mouth once daily., Disp: , Rfl:        Vitals:    05/01/24 1006  "  BP: 112/60   Pulse: 60   SpO2: 98%   Weight: 58.7 kg (129 lb 8.3 oz)   Height: 5' 4" (1.626 m)   PainSc: 0-No pain      Physical Exam  Vitals reviewed.   Constitutional:       General: She is not in acute distress.     Appearance: Normal appearance. She is not ill-appearing.   HENT:      Head: Normocephalic and atraumatic.      Mouth/Throat:      Mouth: Mucous membranes are moist.   Eyes:      General: No scleral icterus.        Right eye: No discharge.         Left eye: No discharge.      Extraocular Movements: Extraocular movements intact.      Conjunctiva/sclera: Conjunctivae normal.   Cardiovascular:      Rate and Rhythm: Normal rate.   Pulmonary:      Effort: Pulmonary effort is normal.   Musculoskeletal:      Cervical back: Normal range of motion.      Right lower leg: No edema.      Left lower leg: No edema.   Skin:     General: Skin is warm and dry.      Findings: No rash.   Neurological:      Mental Status: She is alert and oriented to person, place, and time.   Psychiatric:         Mood and Affect: Mood normal.         Behavior: Behavior normal. Behavior is cooperative.         Cognition and Memory: Cognition and memory normal.               Diagnoses and health risks identified today and associated recommendations/orders:    1. Encounter for preventive health examination  - Chart reviewed. Problem list updated. Discussed current medical diagnosis, current medications, medical/surgical/family/social history; updated provider list; documented vital signs; identified any cognitive impairment; and updated risk factor list. Addressed any outstanding health maintenance. Provided patient with personalized health advice. Continue to follow up with PCP and any specialists.     2. Anxiety and depression  Chronic; stable on current treatment plan; follow up with PCP  - taking celexa     3. Hypothyroidism, unspecified type  Chronic; stable on current treatment plan; follow up with PCP  - taking synthroid    4. " Vertigo  Chronic; stable on current treatment plan; follow up with PCP  - meclizine PRN    5. Gastroesophageal reflux disease, unspecified whether esophagitis present  Chronic; stable on current treatment plan; follow up with PCP    6. Chronic diarrhea  Chronic; stable on current treatment plan; follow up with PCP    7. Well woman exam with routine gynecological exam  - wants to establish with [facility], order placed   - Ambulatory referral/consult to Obstetrics / Gynecology; Future    8. Lichen sclerosus  Chronic; stable on current treatment plan; follow up with PCP  - wants to establish with [facility]  - Ambulatory referral/consult to Obstetrics / Gynecology; Future    9. BMI 22.0-22.9, adult  - Recommendation for healthy diet and increasing exercise as tolerated with goal of 150min/week      Provided Veronica with a 5-10 year written screening schedule and personal prevention plan. Recommendations were developed using the USPSTF age appropriate recommendations. Education, counseling, and referrals were provided as needed. After Visit Summary printed and given to patient which includes a list of additional screenings\tests needed.    Follow up in about 1 year (around 5/1/2025) for your next annual wellness visit.    Yue Ochoa, FNP-C    Advance Care Planning     I offered to discuss advanced care planning, including how to pick a person who would make decisions for you if you were unable to make them for yourself, called a health care power of , and what kind of decisions you might make such as use of life sustaining treatments such as ventilators and tube feeding when faced with a life limiting illness recorded on a living will that they will need to know. (How you want to be cared for as you near the end of your natural life)     X Patient is interested in learning more about how to make advanced directives.  I provided them paperwork and offered to discuss this with them.

## 2024-05-01 ENCOUNTER — OFFICE VISIT (OUTPATIENT)
Dept: FAMILY MEDICINE | Facility: CLINIC | Age: 64
End: 2024-05-01
Payer: MEDICARE

## 2024-05-01 VITALS
WEIGHT: 129.5 LBS | OXYGEN SATURATION: 98 % | HEIGHT: 64 IN | SYSTOLIC BLOOD PRESSURE: 112 MMHG | HEART RATE: 60 BPM | BODY MASS INDEX: 22.11 KG/M2 | DIASTOLIC BLOOD PRESSURE: 60 MMHG

## 2024-05-01 DIAGNOSIS — F41.9 ANXIETY AND DEPRESSION: ICD-10-CM

## 2024-05-01 DIAGNOSIS — E03.9 HYPOTHYROIDISM, UNSPECIFIED TYPE: ICD-10-CM

## 2024-05-01 DIAGNOSIS — K52.9 CHRONIC DIARRHEA: ICD-10-CM

## 2024-05-01 DIAGNOSIS — Z01.419 WELL WOMAN EXAM WITH ROUTINE GYNECOLOGICAL EXAM: ICD-10-CM

## 2024-05-01 DIAGNOSIS — F32.A ANXIETY AND DEPRESSION: ICD-10-CM

## 2024-05-01 DIAGNOSIS — Z00.00 ENCOUNTER FOR PREVENTIVE HEALTH EXAMINATION: Primary | ICD-10-CM

## 2024-05-01 DIAGNOSIS — L90.0 LICHEN SCLEROSUS: ICD-10-CM

## 2024-05-01 DIAGNOSIS — R42 VERTIGO: ICD-10-CM

## 2024-05-01 DIAGNOSIS — K21.9 GASTROESOPHAGEAL REFLUX DISEASE, UNSPECIFIED WHETHER ESOPHAGITIS PRESENT: ICD-10-CM

## 2024-05-01 PROCEDURE — 1159F MED LIST DOCD IN RCRD: CPT | Mod: HCNC,CPTII,S$GLB, | Performed by: NURSE PRACTITIONER

## 2024-05-01 PROCEDURE — G0439 PPPS, SUBSEQ VISIT: HCPCS | Mod: HCNC,S$GLB,, | Performed by: NURSE PRACTITIONER

## 2024-05-01 PROCEDURE — 1160F RVW MEDS BY RX/DR IN RCRD: CPT | Mod: HCNC,CPTII,S$GLB, | Performed by: NURSE PRACTITIONER

## 2024-05-01 PROCEDURE — 3288F FALL RISK ASSESSMENT DOCD: CPT | Mod: HCNC,CPTII,S$GLB, | Performed by: NURSE PRACTITIONER

## 2024-05-01 PROCEDURE — 3078F DIAST BP <80 MM HG: CPT | Mod: HCNC,CPTII,S$GLB, | Performed by: NURSE PRACTITIONER

## 2024-05-01 PROCEDURE — 99999 PR PBB SHADOW E&M-EST. PATIENT-LVL IV: CPT | Mod: PBBFAC,HCNC,, | Performed by: NURSE PRACTITIONER

## 2024-05-01 PROCEDURE — 1170F FXNL STATUS ASSESSED: CPT | Mod: HCNC,CPTII,S$GLB, | Performed by: NURSE PRACTITIONER

## 2024-05-01 PROCEDURE — 3074F SYST BP LT 130 MM HG: CPT | Mod: HCNC,CPTII,S$GLB, | Performed by: NURSE PRACTITIONER

## 2024-05-01 PROCEDURE — 1158F ADVNC CARE PLAN TLK DOCD: CPT | Mod: HCNC,CPTII,S$GLB, | Performed by: NURSE PRACTITIONER

## 2024-05-01 PROCEDURE — 1126F AMNT PAIN NOTED NONE PRSNT: CPT | Mod: HCNC,CPTII,S$GLB, | Performed by: NURSE PRACTITIONER

## 2024-05-01 PROCEDURE — 1101F PT FALLS ASSESS-DOCD LE1/YR: CPT | Mod: HCNC,CPTII,S$GLB, | Performed by: NURSE PRACTITIONER

## 2024-05-01 RX ORDER — CHOLECALCIFEROL (VITAMIN D3) 25 MCG
5000 TABLET ORAL DAILY
COMMUNITY

## 2024-05-01 RX ORDER — ASCORBIC ACID 125 MG
TABLET,CHEWABLE ORAL
COMMUNITY

## 2024-05-01 RX ORDER — AMOXICILLIN 500 MG
CAPSULE ORAL DAILY
COMMUNITY

## 2024-05-01 NOTE — PATIENT INSTRUCTIONS
Counseling and Referral of Other Preventative  (Italic type indicates deductible and co-insurance are waived)    Patient Name: Veronica Ash  Today's Date: 5/1/2024    Health Maintenance       Date Due Completion Date    Pneumococcal Vaccines (Age 65+) (1 of 1 - PCV) 05/09/2024 (Originally 9/12/2023) ---    DEXA Scan 12/09/2024 12/9/2021    Mammogram 12/12/2024 12/12/2023    Lipid Panel 10/18/2027 10/18/2022    Colorectal Cancer Screening 04/27/2028 4/27/2023    TETANUS VACCINE 07/09/2032 7/9/2022        No orders of the defined types were placed in this encounter.    The following information is provided to all patients.  This information is to help you find resources for any of the problems found today that may be affecting your health:                  Living healthy guide: www.[facility].[place].gov      Understanding Diabetes: www.diabetes.org      Eating healthy: www.cdc.gov/healthyweight      [facility] home safety checklist: www.cdc.gov/steadi/patient.html      Agency on Aging: www.goea.[place].[facility]      Alcoholics anonymous (AA): www.aa.org      Physical Activity: www.kain.nih.gov/pm0jngt      Tobacco use: www.quitwithusla.org

## 2025-01-11 DIAGNOSIS — F41.9 ANXIETY AND DEPRESSION: ICD-10-CM

## 2025-01-11 DIAGNOSIS — F32.A ANXIETY AND DEPRESSION: ICD-10-CM

## 2025-01-14 ENCOUNTER — PATIENT MESSAGE (OUTPATIENT)
Dept: FAMILY MEDICINE | Facility: CLINIC | Age: 65
End: 2025-01-14
Payer: MEDICARE

## 2025-01-14 RX ORDER — MELOXICAM 15 MG/1
15 TABLET ORAL
Qty: 90 TABLET | Refills: 0 | Status: SHIPPED | OUTPATIENT
Start: 2025-01-14

## 2025-01-14 RX ORDER — CITALOPRAM 40 MG/1
40 TABLET, FILM COATED ORAL
Qty: 90 TABLET | Refills: 0 | Status: SHIPPED | OUTPATIENT
Start: 2025-01-14

## 2025-01-22 DIAGNOSIS — Z78.0 MENOPAUSE: ICD-10-CM

## 2025-01-31 ENCOUNTER — OFFICE VISIT (OUTPATIENT)
Dept: FAMILY MEDICINE | Facility: CLINIC | Age: 65
End: 2025-01-31
Payer: MEDICARE

## 2025-01-31 ENCOUNTER — LAB VISIT (OUTPATIENT)
Dept: LAB | Facility: HOSPITAL | Age: 65
End: 2025-01-31
Attending: FAMILY MEDICINE
Payer: MEDICARE

## 2025-01-31 VITALS
DIASTOLIC BLOOD PRESSURE: 60 MMHG | SYSTOLIC BLOOD PRESSURE: 118 MMHG | HEART RATE: 52 BPM | BODY MASS INDEX: 22.58 KG/M2 | WEIGHT: 132.25 LBS | HEIGHT: 64 IN

## 2025-01-31 DIAGNOSIS — G47.00 INSOMNIA, UNSPECIFIED TYPE: ICD-10-CM

## 2025-01-31 DIAGNOSIS — F32.A ANXIETY AND DEPRESSION: ICD-10-CM

## 2025-01-31 DIAGNOSIS — Z00.00 ANNUAL PHYSICAL EXAM: Primary | ICD-10-CM

## 2025-01-31 DIAGNOSIS — F41.9 ANXIETY AND DEPRESSION: ICD-10-CM

## 2025-01-31 DIAGNOSIS — E03.9 HYPOTHYROIDISM, UNSPECIFIED TYPE: ICD-10-CM

## 2025-01-31 DIAGNOSIS — Z13.6 ENCOUNTER FOR LIPID SCREENING FOR CARDIOVASCULAR DISEASE: ICD-10-CM

## 2025-01-31 DIAGNOSIS — Z13.220 ENCOUNTER FOR LIPID SCREENING FOR CARDIOVASCULAR DISEASE: ICD-10-CM

## 2025-01-31 DIAGNOSIS — R79.9 ABNORMAL BLOOD CHEMISTRY: ICD-10-CM

## 2025-01-31 DIAGNOSIS — Z00.00 ANNUAL PHYSICAL EXAM: ICD-10-CM

## 2025-01-31 DIAGNOSIS — Z12.31 SCREENING MAMMOGRAM FOR BREAST CANCER: ICD-10-CM

## 2025-01-31 LAB
ALBUMIN SERPL BCP-MCNC: 4 G/DL (ref 3.5–5.2)
ALP SERPL-CCNC: 62 U/L (ref 40–150)
ALT SERPL W/O P-5'-P-CCNC: 12 U/L (ref 10–44)
ANION GAP SERPL CALC-SCNC: 4 MMOL/L (ref 8–16)
AST SERPL-CCNC: 25 U/L (ref 10–40)
BASOPHILS # BLD AUTO: 0.07 K/UL (ref 0–0.2)
BASOPHILS NFR BLD: 1.4 % (ref 0–1.9)
BILIRUB SERPL-MCNC: 0.5 MG/DL (ref 0.1–1)
BUN SERPL-MCNC: 14 MG/DL (ref 8–23)
CALCIUM SERPL-MCNC: 9.5 MG/DL (ref 8.7–10.5)
CHLORIDE SERPL-SCNC: 103 MMOL/L (ref 95–110)
CHOLEST SERPL-MCNC: 224 MG/DL (ref 120–199)
CHOLEST/HDLC SERPL: 2.2 {RATIO} (ref 2–5)
CO2 SERPL-SCNC: 31 MMOL/L (ref 23–29)
CREAT SERPL-MCNC: 0.8 MG/DL (ref 0.5–1.4)
DIFFERENTIAL METHOD BLD: ABNORMAL
EOSINOPHIL # BLD AUTO: 0.4 K/UL (ref 0–0.5)
EOSINOPHIL NFR BLD: 7.9 % (ref 0–8)
ERYTHROCYTE [DISTWIDTH] IN BLOOD BY AUTOMATED COUNT: 11.7 % (ref 11.5–14.5)
EST. GFR  (NO RACE VARIABLE): >60 ML/MIN/1.73 M^2
ESTIMATED AVG GLUCOSE: 100 MG/DL (ref 68–131)
GLUCOSE SERPL-MCNC: 82 MG/DL (ref 70–110)
HBA1C MFR BLD: 5.1 % (ref 4–5.6)
HCT VFR BLD AUTO: 38 % (ref 37–48.5)
HDLC SERPL-MCNC: 101 MG/DL (ref 40–75)
HDLC SERPL: 45.1 % (ref 20–50)
HGB BLD-MCNC: 12.5 G/DL (ref 12–16)
IMM GRANULOCYTES # BLD AUTO: 0.01 K/UL (ref 0–0.04)
IMM GRANULOCYTES NFR BLD AUTO: 0.2 % (ref 0–0.5)
IRON SERPL-MCNC: 130 UG/DL (ref 30–160)
LDLC SERPL CALC-MCNC: 110.8 MG/DL (ref 63–159)
LYMPHOCYTES # BLD AUTO: 2 K/UL (ref 1–4.8)
LYMPHOCYTES NFR BLD: 40.5 % (ref 18–48)
MCH RBC QN AUTO: 31.4 PG (ref 27–31)
MCHC RBC AUTO-ENTMCNC: 32.9 G/DL (ref 32–36)
MCV RBC AUTO: 96 FL (ref 82–98)
MONOCYTES # BLD AUTO: 0.3 K/UL (ref 0.3–1)
MONOCYTES NFR BLD: 6.5 % (ref 4–15)
NEUTROPHILS # BLD AUTO: 2.2 K/UL (ref 1.8–7.7)
NEUTROPHILS NFR BLD: 43.5 % (ref 38–73)
NONHDLC SERPL-MCNC: 123 MG/DL
NRBC BLD-RTO: 0 /100 WBC
PLATELET # BLD AUTO: 256 K/UL (ref 150–450)
PMV BLD AUTO: 9.7 FL (ref 9.2–12.9)
POTASSIUM SERPL-SCNC: 4.5 MMOL/L (ref 3.5–5.1)
PROT SERPL-MCNC: 6.8 G/DL (ref 6–8.4)
RBC # BLD AUTO: 3.98 M/UL (ref 4–5.4)
SATURATED IRON: 30 % (ref 20–50)
SODIUM SERPL-SCNC: 138 MMOL/L (ref 136–145)
TOTAL IRON BINDING CAPACITY: 435 UG/DL (ref 250–450)
TRANSFERRIN SERPL-MCNC: 294 MG/DL (ref 200–375)
TRIGL SERPL-MCNC: 61 MG/DL (ref 30–150)
TSH SERPL DL<=0.005 MIU/L-ACNC: 1.22 UIU/ML (ref 0.4–4)
WBC # BLD AUTO: 4.94 K/UL (ref 3.9–12.7)

## 2025-01-31 PROCEDURE — 84443 ASSAY THYROID STIM HORMONE: CPT | Mod: HCNC | Performed by: FAMILY MEDICINE

## 2025-01-31 PROCEDURE — 3008F BODY MASS INDEX DOCD: CPT | Mod: HCNC,CPTII,S$GLB, | Performed by: FAMILY MEDICINE

## 2025-01-31 PROCEDURE — 3078F DIAST BP <80 MM HG: CPT | Mod: HCNC,CPTII,S$GLB, | Performed by: FAMILY MEDICINE

## 2025-01-31 PROCEDURE — 80061 LIPID PANEL: CPT | Mod: HCNC | Performed by: FAMILY MEDICINE

## 2025-01-31 PROCEDURE — 83540 ASSAY OF IRON: CPT | Mod: HCNC | Performed by: FAMILY MEDICINE

## 2025-01-31 PROCEDURE — 85025 COMPLETE CBC W/AUTO DIFF WBC: CPT | Mod: HCNC | Performed by: FAMILY MEDICINE

## 2025-01-31 PROCEDURE — 3288F FALL RISK ASSESSMENT DOCD: CPT | Mod: HCNC,CPTII,S$GLB, | Performed by: FAMILY MEDICINE

## 2025-01-31 PROCEDURE — 99397 PER PM REEVAL EST PAT 65+ YR: CPT | Mod: HCNC,S$GLB,, | Performed by: FAMILY MEDICINE

## 2025-01-31 PROCEDURE — 99999 PR PBB SHADOW E&M-EST. PATIENT-LVL III: CPT | Mod: PBBFAC,HCNC,, | Performed by: FAMILY MEDICINE

## 2025-01-31 PROCEDURE — 83036 HEMOGLOBIN GLYCOSYLATED A1C: CPT | Mod: HCNC | Performed by: FAMILY MEDICINE

## 2025-01-31 PROCEDURE — 36415 COLL VENOUS BLD VENIPUNCTURE: CPT | Mod: HCNC | Performed by: FAMILY MEDICINE

## 2025-01-31 PROCEDURE — 1125F AMNT PAIN NOTED PAIN PRSNT: CPT | Mod: HCNC,CPTII,S$GLB, | Performed by: FAMILY MEDICINE

## 2025-01-31 PROCEDURE — 1101F PT FALLS ASSESS-DOCD LE1/YR: CPT | Mod: HCNC,CPTII,S$GLB, | Performed by: FAMILY MEDICINE

## 2025-01-31 PROCEDURE — 80053 COMPREHEN METABOLIC PANEL: CPT | Mod: HCNC | Performed by: FAMILY MEDICINE

## 2025-01-31 PROCEDURE — 3074F SYST BP LT 130 MM HG: CPT | Mod: HCNC,CPTII,S$GLB, | Performed by: FAMILY MEDICINE

## 2025-01-31 RX ORDER — LEVOTHYROXINE SODIUM 112 UG/1
112 TABLET ORAL
Qty: 90 TABLET | Refills: 3 | Status: SHIPPED | OUTPATIENT
Start: 2025-01-31

## 2025-01-31 RX ORDER — HYDROXYZINE HYDROCHLORIDE 25 MG/1
25 TABLET, FILM COATED ORAL NIGHTLY PRN
Qty: 60 TABLET | Refills: 2 | Status: SHIPPED | OUTPATIENT
Start: 2025-01-31

## 2025-01-31 NOTE — PROGRESS NOTES
(Portions of this note were dictated using voice recognition software and may contain dictation related errors in spelling/grammar/syntax not found on text review)    CC:   Chief Complaint   Patient presents with    Annual Exam     Physical exam        HPI: 64 y.o. female presented for routine annual examination and labs. She has medical history significant for hypothyroidism, vertigo, GERD, anxiety and depression.    She takes synthroid 112 mcg on daily basis, needs medication refills    She is taking Celexa 40 mg on daily basis, symptoms of anxiety and depression stable.    Patient reports having sleeping difficulties every night, can not go to sleep for several hours, has tried living under missed along with melatonin without any significant relief    She reports having problem with the excessive gassiness and bloating, she is on strict anti-inflammatory diet, does not consume dairy at all as well as red meat, consumes chickpeas, broccoli and cauliflower     She is due for annual labs.      She does not smoke, has no toxic habits.      She is physically active.      No other symptoms or concerns.    Past Medical History:   Diagnosis Date    Abnormal Pap smear of cervix     Depression     GERD (gastroesophageal reflux disease)     Melanoma     Thyroid disease     Vertigo        Past Surgical History:   Procedure Laterality Date    CHOLECYSTECTOMY      COLONOSCOPY N/A 4/27/2023    Procedure: COLONOSCOPY Suprep;  Surgeon: Galo Powers MD;  Location: [facility];  Service: Endoscopy;  Laterality: N/A;    EXCISION OF MELANOMA Left     ankle with removal of 2 lymph nodes    GYNECOLOGIC CRYOSURGERY  1980       Family History   Problem Relation Name Age of Onset    Depression Mother      Alcohol abuse Mother      Cancer Mother      Stomach cancer Mother      Brain cancer Mother      Other cancer Mother          small intestine cancer    Alcohol abuse Father      Diabetes Father      Stroke Father      Lung  cancer Father      Colon cancer Father      Cancer Sister x2     Arthritis Sister x2     Skin cancer Sister x2     Cancer Sister x1     Ovarian cancer Sister x1     Glaucoma Sister x1     Alcohol abuse Brother x1     Hypertension Son x1     Anxiety disorder Son x1     Ovarian cancer Maternal Grandmother         Social History     Tobacco Use    Smoking status: Never     Passive exposure: Never    Smokeless tobacco: Never   Substance Use Topics    Alcohol use: Yes     Comment: once per week    Drug use: Never       Lab Results   Component Value Date    WBC 6.52 10/18/2022    HGB 12.9 10/18/2022    HCT 38.5 10/18/2022    MCV 96 10/18/2022     10/18/2022    CHOL 247 (H) 10/18/2022    TRIG 58 10/18/2022    HDL 94 (H) 10/18/2022    ALT 16 10/18/2022    AST 28 10/18/2022    BILITOT 0.4 10/18/2022    ALKPHOS 60 10/18/2022     10/18/2022    K 4.6 10/18/2022     10/18/2022    CREATININE 0.65 10/18/2022    CALCIUM 9.3 10/18/2022    ALBUMIN 4.4 10/18/2022    BUN 17 10/18/2022    CO2 30 (H) 10/18/2022    TSH 2.468 01/10/2023    LDLCALC 141.4 10/18/2022    GLU 88 10/18/2022    [id number] 35 12/03/2021             Vital signs reviewed  PE:   APPEARANCE: Well nourished, well developed, in no acute distress.    HEAD: Normocephalic, atraumatic.  EYES: EOMI.  Conjunctivae noninjected.  NOSE: Mucosa pink. Airway clear.  NECK: Supple with no cervical lymphadenopathy.    CHEST: Good inspiratory effort. Lungs clear to auscultation with no wheezes or crackles.  CARDIOVASCULAR: Normal S1, S2. No rubs, murmurs, or gallops.  ABDOMEN: Bowel sounds normal. Not distended. Soft. No tenderness or masses. No organomegaly.  EXTREMITIES: No edema, cyanosis, or clubbing.    Review of Systems   Constitutional:  Negative for activity change, chills, fatigue and unexpected weight change.   HENT:  Negative for hearing loss, rhinorrhea and trouble swallowing.    Eyes:  Negative for discharge and visual disturbance.   Respiratory:   Negative for chest tightness and wheezing.    Cardiovascular:  Negative for chest pain and palpitations.   Gastrointestinal:  Negative for blood in stool, constipation, diarrhea and vomiting.   Endocrine: Negative for polydipsia and polyuria.   Genitourinary:  Negative for difficulty urinating, dysuria, hematuria and menstrual problem.   Musculoskeletal:  Positive for arthralgias. Negative for joint swelling and neck pain.   Neurological:  Negative for weakness and headaches.   Psychiatric/Behavioral:  Negative for confusion and dysphoric mood.    All other systems reviewed and are negative.      IMPRESSION  1. Annual physical exam    2. Anxiety and depression    3. Hypothyroidism, unspecified type    4. Screening mammogram for breast cancer    5. Insomnia, unspecified type    6. Encounter for lipid screening for cardiovascular disease    7. Abnormal blood chemistry            PLAN      1. Anxiety and depression    - CBC Auto Differential; Future  - Comprehensive Metabolic Panel; Future    Stable    Continue Celexa      2. Hypothyroidism, unspecified type    - TSH; Future      3. Annual physical exam (Primary)    - CBC Auto Differential; Future  - Comprehensive Metabolic Panel; Future  - Lipid Panel; Future  - Hemoglobin A1C; Future  - TSH; Future  - Iron and TIBC; Future      4. Screening mammogram for breast cancer    - Mammo Digital Screening Bilat w/ Mook; Future      5. Insomnia, unspecified type    - hydrOXYzine HCL (ATARAX) 25 MG tablet; Take 1 tablet (25 mg total) by mouth nightly as needed (insomnia).  Dispense: 60 tablet; Refill: 2      6. Encounter for lipid screening for cardiovascular disease    - Lipid Panel; Future      7. Abnormal blood chemistry    - Hemoglobin A1C; Future         SCREENINGS      Immunizations:     Up-to-date with flu vaccine     Up-to-date with COVID vaccines     Up-to-date with Tdap      Age/demographic appropriate health maintenance:    Mammogram scheduled     DEXA scan  ordered      Health Maintenance Due   Topic Date Due    DEXA Scan  12/09/2024    Mammogram  12/12/2024           Spent adequate time in obtaining history and explaining differentials     40 minutes spent during this visit of which greater than 50% devoted to face-face counseling and coordination of care regarding diagnosis and management plan       Yohannes Arevalo   1/31/2025

## 2025-02-03 ENCOUNTER — HOSPITAL ENCOUNTER (OUTPATIENT)
Dept: RADIOLOGY | Facility: HOSPITAL | Age: 65
Discharge: HOME | End: 2025-02-03
Attending: FAMILY MEDICINE
Payer: MEDICARE

## 2025-02-03 DIAGNOSIS — Z78.0 MENOPAUSE: ICD-10-CM

## 2025-02-03 DIAGNOSIS — Z12.31 SCREENING MAMMOGRAM FOR BREAST CANCER: ICD-10-CM

## 2025-02-03 PROCEDURE — 77080 DXA BONE DENSITY AXIAL: CPT | Mod: 26,HCNC,, | Performed by: RADIOLOGY

## 2025-02-03 PROCEDURE — 77063 BREAST TOMOSYNTHESIS BI: CPT | Mod: TC,HCNC

## 2025-02-03 PROCEDURE — 77063 BREAST TOMOSYNTHESIS BI: CPT | Mod: 26,HCNC,, | Performed by: RADIOLOGY

## 2025-02-03 PROCEDURE — 77080 DXA BONE DENSITY AXIAL: CPT | Mod: TC,HCNC

## 2025-02-03 PROCEDURE — 77067 SCR MAMMO BI INCL CAD: CPT | Mod: 26,HCNC,, | Performed by: RADIOLOGY

## 2025-02-24 DIAGNOSIS — Z00.00 ENCOUNTER FOR MEDICARE ANNUAL WELLNESS EXAM: ICD-10-CM

## 2025-03-29 DIAGNOSIS — F32.A ANXIETY AND DEPRESSION: ICD-10-CM

## 2025-03-29 DIAGNOSIS — F41.9 ANXIETY AND DEPRESSION: ICD-10-CM

## 2025-03-31 RX ORDER — MELOXICAM 15 MG/1
15 TABLET ORAL
Qty: 90 TABLET | Refills: 3 | Status: SHIPPED | OUTPATIENT
Start: 2025-03-31

## 2025-03-31 RX ORDER — CITALOPRAM 40 MG/1
40 TABLET, FILM COATED ORAL
Qty: 90 TABLET | Refills: 3 | Status: SHIPPED | OUTPATIENT
Start: 2025-03-31

## 2025-07-25 DIAGNOSIS — G47.00 INSOMNIA, UNSPECIFIED TYPE: ICD-10-CM

## 2025-07-25 RX ORDER — HYDROXYZINE HYDROCHLORIDE 25 MG/1
25 TABLET, FILM COATED ORAL NIGHTLY
Qty: 60 TABLET | Refills: 1 | Status: SHIPPED | OUTPATIENT
Start: 2025-07-25

## 2025-09-02 ENCOUNTER — HOSPITAL ENCOUNTER (EMERGENCY)
Facility: HOSPITAL | Age: 65
Discharge: HOME | End: 2025-09-02
Attending: EMERGENCY MEDICINE
Payer: MEDICARE

## 2025-09-02 VITALS
RESPIRATION RATE: 22 BRPM | TEMPERATURE: 99 F | HEIGHT: 64 IN | OXYGEN SATURATION: 99 % | HEART RATE: 90 BPM | BODY MASS INDEX: 23.22 KG/M2 | DIASTOLIC BLOOD PRESSURE: 86 MMHG | SYSTOLIC BLOOD PRESSURE: 140 MMHG | WEIGHT: 136 LBS

## 2025-09-02 DIAGNOSIS — M54.42 ACUTE LEFT-SIDED LOW BACK PAIN WITH LEFT-SIDED SCIATICA: Primary | ICD-10-CM

## 2025-09-02 PROCEDURE — 63600175 PHARM REV CODE 636 W HCPCS: Mod: JZ,TB,HCNC,ER | Performed by: EMERGENCY MEDICINE

## 2025-09-02 PROCEDURE — 25000003 PHARM REV CODE 250: Mod: HCNC,ER | Performed by: EMERGENCY MEDICINE

## 2025-09-02 PROCEDURE — 96372 THER/PROPH/DIAG INJ SC/IM: CPT | Performed by: EMERGENCY MEDICINE

## 2025-09-02 PROCEDURE — 99284 EMERGENCY DEPT VISIT MOD MDM: CPT | Mod: 25,HCNC,ER

## 2025-09-02 RX ORDER — DEXAMETHASONE SODIUM PHOSPHATE 4 MG/ML
8 INJECTION, SOLUTION INTRA-ARTICULAR; INTRALESIONAL; INTRAMUSCULAR; INTRAVENOUS; SOFT TISSUE
Status: COMPLETED | OUTPATIENT
Start: 2025-09-02 | End: 2025-09-02

## 2025-09-02 RX ORDER — MELOXICAM 15 MG/1
15 TABLET ORAL DAILY
Qty: 30 TABLET | Refills: 0 | Status: SHIPPED | OUTPATIENT
Start: 2025-09-02

## 2025-09-02 RX ORDER — LIDOCAINE 50 MG/G
1 PATCH TOPICAL DAILY
Qty: 10 PATCH | Refills: 0 | Status: SHIPPED | OUTPATIENT
Start: 2025-09-02

## 2025-09-02 RX ORDER — PREDNISONE 20 MG/1
60 TABLET ORAL DAILY
Qty: 21 TABLET | Refills: 0 | Status: SHIPPED | OUTPATIENT
Start: 2025-09-02 | End: 2025-09-09

## 2025-09-02 RX ORDER — KETOROLAC TROMETHAMINE 30 MG/ML
15 INJECTION, SOLUTION INTRAMUSCULAR; INTRAVENOUS
Status: COMPLETED | OUTPATIENT
Start: 2025-09-02 | End: 2025-09-02

## 2025-09-02 RX ORDER — CYCLOBENZAPRINE HCL 5 MG
10 TABLET ORAL
Status: COMPLETED | OUTPATIENT
Start: 2025-09-02 | End: 2025-09-02

## 2025-09-02 RX ORDER — LIDOCAINE 50 MG/G
1 PATCH TOPICAL
Status: DISCONTINUED | OUTPATIENT
Start: 2025-09-02 | End: 2025-09-02 | Stop reason: HOSPADM

## 2025-09-02 RX ORDER — CYCLOBENZAPRINE HCL 10 MG
10 TABLET ORAL 3 TIMES DAILY PRN
Qty: 15 TABLET | Refills: 0 | Status: SHIPPED | OUTPATIENT
Start: 2025-09-02 | End: 2025-09-04 | Stop reason: SDUPTHER

## 2025-09-02 RX ADMIN — KETOROLAC TROMETHAMINE 15 MG: 30 INJECTION, SOLUTION INTRAMUSCULAR; INTRAVENOUS at 02:09

## 2025-09-02 RX ADMIN — CYCLOBENZAPRINE HYDROCHLORIDE 10 MG: 5 TABLET, FILM COATED ORAL at 02:09

## 2025-09-02 RX ADMIN — LIDOCAINE 1 PATCH: 50 PATCH CUTANEOUS at 02:09

## 2025-09-02 RX ADMIN — DEXAMETHASONE SODIUM PHOSPHATE 8 MG: 4 INJECTION, SOLUTION INTRA-ARTICULAR; INTRALESIONAL; INTRAMUSCULAR; INTRAVENOUS; SOFT TISSUE at 02:09

## 2025-09-03 ENCOUNTER — PATIENT MESSAGE (OUTPATIENT)
Dept: FAMILY MEDICINE | Facility: CLINIC | Age: 65
End: 2025-09-03
Payer: MEDICARE

## 2025-09-03 ENCOUNTER — ON-DEMAND VIRTUAL (OUTPATIENT)
Dept: URGENT CARE | Facility: CLINIC | Age: 65
End: 2025-09-03
Payer: MEDICARE

## 2025-09-03 ENCOUNTER — TELEPHONE (OUTPATIENT)
Dept: FAMILY MEDICINE | Facility: CLINIC | Age: 65
End: 2025-09-03
Payer: MEDICARE

## 2025-09-03 DIAGNOSIS — M54.9 BACK PAIN, UNSPECIFIED BACK LOCATION, UNSPECIFIED BACK PAIN LATERALITY, UNSPECIFIED CHRONICITY: Primary | ICD-10-CM

## 2025-09-03 PROCEDURE — 99499 UNLISTED E&M SERVICE: CPT | Mod: 95,,, | Performed by: NURSE PRACTITIONER

## 2025-09-04 ENCOUNTER — HOSPITAL ENCOUNTER (OUTPATIENT)
Dept: RADIOLOGY | Facility: HOSPITAL | Age: 65
Discharge: HOME | End: 2025-09-04
Attending: FAMILY MEDICINE
Payer: MEDICARE

## 2025-09-04 ENCOUNTER — OFFICE VISIT (OUTPATIENT)
Dept: FAMILY MEDICINE | Facility: CLINIC | Age: 65
End: 2025-09-04
Payer: MEDICARE

## 2025-09-04 VITALS
WEIGHT: 138.25 LBS | BODY MASS INDEX: 23.6 KG/M2 | HEIGHT: 64 IN | OXYGEN SATURATION: 98 % | DIASTOLIC BLOOD PRESSURE: 90 MMHG | SYSTOLIC BLOOD PRESSURE: 148 MMHG | TEMPERATURE: 99 F | HEART RATE: 80 BPM

## 2025-09-04 DIAGNOSIS — M25.552 PAIN OF LEFT HIP: ICD-10-CM

## 2025-09-04 DIAGNOSIS — L90.0 LICHEN SCLEROSUS: ICD-10-CM

## 2025-09-04 DIAGNOSIS — M25.552 PAIN OF LEFT HIP: Primary | ICD-10-CM

## 2025-09-04 DIAGNOSIS — G89.29 CHRONIC LEFT-SIDED LOW BACK PAIN WITH LEFT-SIDED SCIATICA: ICD-10-CM

## 2025-09-04 DIAGNOSIS — M54.42 CHRONIC LEFT-SIDED LOW BACK PAIN WITH LEFT-SIDED SCIATICA: ICD-10-CM

## 2025-09-04 PROCEDURE — 99999 PR PBB SHADOW E&M-EST. PATIENT-LVL V: CPT | Mod: PBBFAC,HCNC,, | Performed by: FAMILY MEDICINE

## 2025-09-04 PROCEDURE — 73502 X-RAY EXAM HIP UNI 2-3 VIEWS: CPT | Mod: TC,HCNC,LT

## 2025-09-04 PROCEDURE — 73502 X-RAY EXAM HIP UNI 2-3 VIEWS: CPT | Mod: 26,HCNC,LT, | Performed by: RADIOLOGY

## 2025-09-04 RX ORDER — CYCLOBENZAPRINE HCL 10 MG
10 TABLET ORAL 3 TIMES DAILY PRN
Qty: 15 TABLET | Refills: 0 | Status: SHIPPED | OUTPATIENT
Start: 2025-09-04 | End: 2025-09-04 | Stop reason: SDUPTHER

## 2025-09-04 RX ORDER — TRIAMCINOLONE ACETONIDE 40 MG/ML
40 INJECTION, SUSPENSION INTRA-ARTICULAR; INTRAMUSCULAR
Status: COMPLETED | OUTPATIENT
Start: 2025-09-04 | End: 2025-09-04

## 2025-09-04 RX ORDER — CLOBETASOL PROPIONATE 0.5 MG/G
OINTMENT TOPICAL 2 TIMES DAILY
Qty: 45 G | Refills: 1 | Status: SHIPPED | OUTPATIENT
Start: 2025-09-04

## 2025-09-04 RX ORDER — CYCLOBENZAPRINE HCL 10 MG
10 TABLET ORAL 3 TIMES DAILY PRN
Qty: 15 TABLET | Refills: 0 | Status: SHIPPED | OUTPATIENT
Start: 2025-09-04 | End: 2025-09-09

## 2025-09-04 RX ORDER — HYDROCODONE BITARTRATE AND ACETAMINOPHEN 5; 325 MG/1; MG/1
1 TABLET ORAL EVERY 8 HOURS PRN
Qty: 15 TABLET | Refills: 0 | Status: SHIPPED | OUTPATIENT
Start: 2025-09-04

## 2025-09-04 RX ADMIN — TRIAMCINOLONE ACETONIDE 40 MG: 40 INJECTION, SUSPENSION INTRA-ARTICULAR; INTRAMUSCULAR at 02:09

## 2025-09-05 ENCOUNTER — RESULTS FOLLOW-UP (OUTPATIENT)
Dept: FAMILY MEDICINE | Facility: CLINIC | Age: 65
End: 2025-09-05
Payer: MEDICARE